# Patient Record
Sex: FEMALE | Race: WHITE | Employment: OTHER | URBAN - METROPOLITAN AREA
[De-identification: names, ages, dates, MRNs, and addresses within clinical notes are randomized per-mention and may not be internally consistent; named-entity substitution may affect disease eponyms.]

---

## 2019-02-27 ENCOUNTER — HOSPITAL ENCOUNTER (OUTPATIENT)
Facility: CLINIC | Age: 79
Discharge: HOME | End: 2019-02-27
Attending: INTERNAL MEDICINE | Admitting: INTERNAL MEDICINE
Payer: COMMERCIAL

## 2019-02-27 VITALS
SYSTOLIC BLOOD PRESSURE: 117 MMHG | HEART RATE: 69 BPM | OXYGEN SATURATION: 98 % | RESPIRATION RATE: 20 BRPM | DIASTOLIC BLOOD PRESSURE: 74 MMHG

## 2019-02-27 LAB — COLONOSCOPY: NORMAL

## 2019-02-27 PROCEDURE — 88305 TISSUE EXAM BY PATHOLOGIST: CPT | Performed by: INTERNAL MEDICINE

## 2019-02-27 PROCEDURE — 45380 COLONOSCOPY AND BIOPSY: CPT | Performed by: INTERNAL MEDICINE

## 2019-02-27 PROCEDURE — G0500 MOD SEDAT ENDO SERVICE >5YRS: HCPCS | Performed by: INTERNAL MEDICINE

## 2019-02-27 PROCEDURE — 45385 COLONOSCOPY W/LESION REMOVAL: CPT | Mod: PT | Performed by: INTERNAL MEDICINE

## 2019-02-27 PROCEDURE — 25000128 H RX IP 250 OP 636: Performed by: INTERNAL MEDICINE

## 2019-02-27 PROCEDURE — 88305 TISSUE EXAM BY PATHOLOGIST: CPT | Mod: 26 | Performed by: INTERNAL MEDICINE

## 2019-02-27 PROCEDURE — 99153 MOD SED SAME PHYS/QHP EA: CPT | Performed by: INTERNAL MEDICINE

## 2019-02-27 RX ORDER — ONDANSETRON 4 MG/1
4 TABLET, ORALLY DISINTEGRATING ORAL EVERY 6 HOURS PRN
Status: DISCONTINUED | OUTPATIENT
Start: 2019-02-27 | End: 2019-02-27 | Stop reason: HOSPADM

## 2019-02-27 RX ORDER — FLUMAZENIL 0.1 MG/ML
0.2 INJECTION, SOLUTION INTRAVENOUS
Status: DISCONTINUED | OUTPATIENT
Start: 2019-02-27 | End: 2019-02-27 | Stop reason: HOSPADM

## 2019-02-27 RX ORDER — NALOXONE HYDROCHLORIDE 0.4 MG/ML
.1-.4 INJECTION, SOLUTION INTRAMUSCULAR; INTRAVENOUS; SUBCUTANEOUS
Status: DISCONTINUED | OUTPATIENT
Start: 2019-02-27 | End: 2019-02-27 | Stop reason: HOSPADM

## 2019-02-27 RX ORDER — ONDANSETRON 2 MG/ML
4 INJECTION INTRAMUSCULAR; INTRAVENOUS EVERY 6 HOURS PRN
Status: DISCONTINUED | OUTPATIENT
Start: 2019-02-27 | End: 2019-02-27 | Stop reason: HOSPADM

## 2019-02-27 RX ORDER — FENTANYL CITRATE 50 UG/ML
INJECTION, SOLUTION INTRAMUSCULAR; INTRAVENOUS PRN
Status: DISCONTINUED | OUTPATIENT
Start: 2019-02-27 | End: 2019-02-27 | Stop reason: HOSPADM

## 2019-02-27 RX ORDER — CALCIUM CARBONATE 500(1250)
1 TABLET ORAL 2 TIMES DAILY
Status: ON HOLD | COMMUNITY
End: 2019-04-22

## 2019-02-27 RX ORDER — LIDOCAINE 40 MG/G
CREAM TOPICAL
Status: DISCONTINUED | OUTPATIENT
Start: 2019-02-27 | End: 2019-02-27 | Stop reason: HOSPADM

## 2019-02-27 RX ORDER — CHOLECALCIFEROL (VITAMIN D3) 50 MCG
1 TABLET ORAL DAILY
COMMUNITY

## 2019-02-27 RX ORDER — ONDANSETRON 2 MG/ML
4 INJECTION INTRAMUSCULAR; INTRAVENOUS
Status: DISCONTINUED | OUTPATIENT
Start: 2019-02-27 | End: 2019-02-27 | Stop reason: HOSPADM

## 2019-02-27 RX ORDER — RIBOFLAVIN (VITAMIN B2) 100 MG
100 TABLET ORAL 3 TIMES DAILY
Status: ON HOLD | COMMUNITY
End: 2019-04-22

## 2019-02-27 RX ORDER — MULTIPLE VITAMINS W/ MINERALS TAB 9MG-400MCG
1 TAB ORAL DAILY
COMMUNITY

## 2019-02-27 NOTE — PROCEDURES
PRE-PROCEDURE H&P    CHIEF COMPLAINT / REASON FOR PROCEDURE:  screening    PERTINENT HISTORY :    History reviewed. No pertinent past medical history.   Past Surgical History:   Procedure Laterality Date      SECTION       COLONOSCOPY  2019    Dr. Zayda block fibroid           Bleeding tendencies:  No    Relevant Family History:  NONE     Relevant Social History:  NONE      A relevant review of systems was performed and was negative      ALLERGIES/SENSITIVITIES:   Allergies   Allergen Reactions     Penicillin G Itching     Sulfa Drugs Itching       CURRENT MEDICATIONS:   No current outpatient medications on file.        PRE-SEDATION ASSESSMENT:    Lung Exam:  normal  Heart Exam:  normal  Airway Exam: normal  Previous reaction to anesthesia/sedation:   No  Sedation plan based on assessment: Moderate (conscious) sedation  ASA Classification:  1 - Healthy patient, no medical problems        IMPRESSION:  screening    PLAN:  colonoscopy    Carmen Loaiza  [facility]  Office: [phone]

## 2019-02-27 NOTE — DISCHARGE INSTRUCTIONS
HIGH FIBER DIET  Fiber is present in all fruits, vegetables, cereals and grains. Fiber passes through the body undigested. A high fiber diet helps food move through the intestinal tract. The added bulk is helpful in preventing constipation. In people with diverticulosis it serves to clean out the pouches along the colon wall while preventing new ones from forming. A high fiber diet also reduces the risk of colon cancer, decreases blood cholesterol and prevents high blood sugar in people with diabetes.    The foods listed below are high in fiber and should be included in your diet. If you are not used to high fiber foods, start with 1 or 2 foods from this list. Every 3-4 days add a new one to your diet until you are eating 4 high fiber foods per day. This should give you 20-35 Gm of fiber/day. It is also important to drink a lot of water when you are on this diet (6-8 glasses a day). Water causes the fiber to swell and increases the benefit.    FOODS HIGH IN DIETARY FIBER:  BREADS: Made with 100% whole wheat flour; lucille, wheat or rye crackers; tortillas, bran muffins  CEREALS: Whole grain cereal with bran (Chex, Raisin Bran, Corn Bran), oatmeal, rolled oats, granola, wheat flakes, brown rice  NUTS: Any nuts  FRUITS: All fresh fruits along with edible skins, (bananas, citrus fruit, mangoes, pears, prunes, raisins, apples, pineapple, apricot, melon, jams and marmalades), fruit juices (especially prune juice)  VEGETABLES: All types, preferably raw or lightly cooked: especially, celery, eggplant, potatoes, spinach, broccoli, brussel sprouts, winter squash, carrots, cauliflower, soybeans, lentils, fresh and dried beans of all kinds  OTHER: Popcorn, any spices      2207-5603 [employer], [address] [place] [address], [place] [address]. All rights reserved. This information is not intended as a substitute for professional medical care. Always follow your healthcare professional's instructions.      Understanding Colon and  Rectal Polyps     The colon has a smooth lining composed of millions of cells.     The colon (also called the large intestine) is a muscular tube that forms the last part of the digestive tract. It absorbs water and stores food waste. The colon is about 4 to 6 feet long. The rectum is the last 6 inches of the colon. The colon and rectum have a smooth lining composed of millions of cells. Changes in these cells can lead to growths in the colon that can become cancerous and should be removed.     When the Colon Lining Changes  Changes that occur in the cells that line the colon or rectum can lead to growths called polyps. Over a period of years, polyps can turn cancerous. Removing polyps early may prevent cancer from ever forming.      Polyps  Polyps are fleshy clumps of tissue that form on the lining of the colon or rectum. Small polyps are usually benign (not cancerous). However, over time, cells in a polyp can change and become cancerous. The larger a polyp grows, the more likely this is to happen. Also, certain types of polyps known as adenomatous polyps are considered premalignant. This means that they will almost always become cancerous if they re not removed.          Cancer  Almost all colorectal cancers start when polyp cells begin growing abnormally. As a cancerous tumor grows, it may involve more and more of the colon or rectum. In time, cancer can also grow beyond the colon or rectum and spread to nearby organs or to glands called lymph nodes. The cells can also travel to other parts of the body. This is known as metastasis. The earlier a cancerous tumor is removed, the better the chance of preventing its spread.        6227-7156 [employer] [facility] [place], [address] [place] [address], [place] [address]. All rights reserved. This information is not intended as a substitute for professional medical care. Always follow your healthcare professional's instructions.

## 2019-02-28 LAB — COPATH REPORT: NORMAL

## 2019-04-22 ENCOUNTER — HOSPITAL ENCOUNTER (OUTPATIENT)
Facility: CLINIC | Age: 79
Discharge: HOME | End: 2019-04-25
Attending: HOSPITALIST | Admitting: INTERNAL MEDICINE
Payer: COMMERCIAL

## 2019-04-22 ENCOUNTER — APPOINTMENT (OUTPATIENT)
Dept: ULTRASOUND IMAGING | Facility: CLINIC | Age: 79
End: 2019-04-22
Attending: PHYSICIAN ASSISTANT
Payer: COMMERCIAL

## 2019-04-22 DIAGNOSIS — Z90.49 S/P LAPAROSCOPIC CHOLECYSTECTOMY: Primary | ICD-10-CM

## 2019-04-22 PROBLEM — K81.0 ACUTE CHOLECYSTITIS: Status: ACTIVE | Noted: 2019-04-22

## 2019-04-22 LAB
ALBUMIN SERPL-MCNC: 3.8 G/DL (ref 3.4–5)
ALP SERPL-CCNC: 96 U/L (ref 40–150)
ALT SERPL W P-5'-P-CCNC: 17 U/L (ref 0–50)
ANION GAP SERPL CALCULATED.3IONS-SCNC: 4 MMOL/L (ref 3–14)
AST SERPL W P-5'-P-CCNC: 18 U/L (ref 0–45)
BILIRUB SERPL-MCNC: 0.7 MG/DL (ref 0.2–1.3)
BUN SERPL-MCNC: 7 MG/DL (ref 7–30)
CALCIUM SERPL-MCNC: 10.1 MG/DL (ref 8.5–10.1)
CHLORIDE SERPL-SCNC: 103 MMOL/L (ref 94–109)
CO2 SERPL-SCNC: 29 MMOL/L (ref 20–32)
CREAT SERPL-MCNC: 0.72 MG/DL (ref 0.52–1.04)
GFR SERPL CREATININE-BSD FRML MDRD: 78 ML/MIN/{1.73_M2}
GLUCOSE SERPL-MCNC: 133 MG/DL (ref 70–99)
LIPASE SERPL-CCNC: 162 U/L (ref 73–393)
POTASSIUM SERPL-SCNC: 4 MMOL/L (ref 3.4–5.3)
PROT SERPL-MCNC: 7.8 G/DL (ref 6.8–8.8)
SODIUM SERPL-SCNC: 136 MMOL/L (ref 133–144)

## 2019-04-22 PROCEDURE — 93010 ELECTROCARDIOGRAM REPORT: CPT | Performed by: INTERNAL MEDICINE

## 2019-04-22 PROCEDURE — 40000275 ZZH STATISTIC RCP TIME EA 10 MIN

## 2019-04-22 PROCEDURE — 80053 COMPREHEN METABOLIC PANEL: CPT | Performed by: PHYSICIAN ASSISTANT

## 2019-04-22 PROCEDURE — 93005 ELECTROCARDIOGRAM TRACING: CPT

## 2019-04-22 PROCEDURE — 25800030 ZZH RX IP 258 OP 636: Performed by: PHYSICIAN ASSISTANT

## 2019-04-22 PROCEDURE — 76705 ECHO EXAM OF ABDOMEN: CPT

## 2019-04-22 PROCEDURE — G0378 HOSPITAL OBSERVATION PER HR: HCPCS

## 2019-04-22 PROCEDURE — 36415 COLL VENOUS BLD VENIPUNCTURE: CPT | Performed by: PHYSICIAN ASSISTANT

## 2019-04-22 PROCEDURE — 83690 ASSAY OF LIPASE: CPT | Performed by: PHYSICIAN ASSISTANT

## 2019-04-22 PROCEDURE — 25000128 H RX IP 250 OP 636: Performed by: PHYSICIAN ASSISTANT

## 2019-04-22 PROCEDURE — 99207 ZZC CDG-CODE CATEGORY CHANGED: CPT | Performed by: PHYSICIAN ASSISTANT

## 2019-04-22 PROCEDURE — 99204 OFFICE O/P NEW MOD 45 MIN: CPT | Performed by: PHYSICIAN ASSISTANT

## 2019-04-22 RX ORDER — AMOXICILLIN 250 MG
1 CAPSULE ORAL 2 TIMES DAILY PRN
Status: DISCONTINUED | OUTPATIENT
Start: 2019-04-22 | End: 2019-04-25 | Stop reason: HOSPADM

## 2019-04-22 RX ORDER — AMOXICILLIN 250 MG
2 CAPSULE ORAL 2 TIMES DAILY PRN
Status: DISCONTINUED | OUTPATIENT
Start: 2019-04-22 | End: 2019-04-25 | Stop reason: HOSPADM

## 2019-04-22 RX ORDER — ACETAMINOPHEN 650 MG/1
650 SUPPOSITORY RECTAL EVERY 4 HOURS PRN
Status: DISCONTINUED | OUTPATIENT
Start: 2019-04-22 | End: 2019-04-25 | Stop reason: HOSPADM

## 2019-04-22 RX ORDER — BRIMONIDINE TARTRATE 1.5 MG/ML
1 SOLUTION/ DROPS OPHTHALMIC AT BEDTIME
COMMUNITY

## 2019-04-22 RX ORDER — ACETAMINOPHEN 500 MG
500-1000 TABLET ORAL 2 TIMES DAILY PRN
COMMUNITY

## 2019-04-22 RX ORDER — ONDANSETRON 4 MG/1
4 TABLET, ORALLY DISINTEGRATING ORAL EVERY 6 HOURS PRN
Status: DISCONTINUED | OUTPATIENT
Start: 2019-04-22 | End: 2019-04-23

## 2019-04-22 RX ORDER — NALOXONE HYDROCHLORIDE 0.4 MG/ML
.1-.4 INJECTION, SOLUTION INTRAMUSCULAR; INTRAVENOUS; SUBCUTANEOUS
Status: DISCONTINUED | OUTPATIENT
Start: 2019-04-22 | End: 2019-04-23

## 2019-04-22 RX ORDER — ONDANSETRON 2 MG/ML
4 INJECTION INTRAMUSCULAR; INTRAVENOUS EVERY 6 HOURS PRN
Status: DISCONTINUED | OUTPATIENT
Start: 2019-04-22 | End: 2019-04-23

## 2019-04-22 RX ORDER — POLYETHYLENE GLYCOL 3350 17 G/17G
17 POWDER, FOR SOLUTION ORAL DAILY PRN
Status: DISCONTINUED | OUTPATIENT
Start: 2019-04-22 | End: 2019-04-25 | Stop reason: HOSPADM

## 2019-04-22 RX ORDER — SODIUM CHLORIDE, SODIUM LACTATE, POTASSIUM CHLORIDE, CALCIUM CHLORIDE 600; 310; 30; 20 MG/100ML; MG/100ML; MG/100ML; MG/100ML
INJECTION, SOLUTION INTRAVENOUS CONTINUOUS
Status: DISCONTINUED | OUTPATIENT
Start: 2019-04-22 | End: 2019-04-24

## 2019-04-22 RX ORDER — ACETAMINOPHEN 325 MG/1
650 TABLET ORAL EVERY 4 HOURS PRN
Status: DISCONTINUED | OUTPATIENT
Start: 2019-04-22 | End: 2019-04-23

## 2019-04-22 RX ORDER — HYDROMORPHONE HYDROCHLORIDE 1 MG/ML
0.3 INJECTION, SOLUTION INTRAMUSCULAR; INTRAVENOUS; SUBCUTANEOUS
Status: DISCONTINUED | OUTPATIENT
Start: 2019-04-22 | End: 2019-04-23

## 2019-04-22 RX ORDER — OXYCODONE HYDROCHLORIDE 5 MG/1
5-10 TABLET ORAL
Status: DISCONTINUED | OUTPATIENT
Start: 2019-04-22 | End: 2019-04-25 | Stop reason: HOSPADM

## 2019-04-22 RX ADMIN — SODIUM CHLORIDE, POTASSIUM CHLORIDE, SODIUM LACTATE AND CALCIUM CHLORIDE: 600; 310; 30; 20 INJECTION, SOLUTION INTRAVENOUS at 18:38

## 2019-04-22 RX ADMIN — Medication 0.3 MG: at 20:37

## 2019-04-23 ENCOUNTER — APPOINTMENT (OUTPATIENT)
Dept: SURGERY | Facility: PHYSICIAN GROUP | Age: 79
End: 2019-04-23
Payer: COMMERCIAL

## 2019-04-23 ENCOUNTER — APPOINTMENT (OUTPATIENT)
Dept: GENERAL RADIOLOGY | Facility: CLINIC | Age: 79
End: 2019-04-23
Attending: SURGERY
Payer: COMMERCIAL

## 2019-04-23 ENCOUNTER — ANESTHESIA EVENT (OUTPATIENT)
Dept: SURGERY | Facility: CLINIC | Age: 79
End: 2019-04-23
Payer: COMMERCIAL

## 2019-04-23 ENCOUNTER — ANESTHESIA (OUTPATIENT)
Dept: SURGERY | Facility: CLINIC | Age: 79
End: 2019-04-23
Payer: COMMERCIAL

## 2019-04-23 PROBLEM — K81.9 CHOLECYSTITIS: Status: ACTIVE | Noted: 2019-04-23

## 2019-04-23 LAB
ALBUMIN SERPL-MCNC: 3.4 G/DL (ref 3.4–5)
ALP SERPL-CCNC: 85 U/L (ref 40–150)
ALT SERPL W P-5'-P-CCNC: 16 U/L (ref 0–50)
ANION GAP SERPL CALCULATED.3IONS-SCNC: 5 MMOL/L (ref 3–14)
AST SERPL W P-5'-P-CCNC: 15 U/L (ref 0–45)
BILIRUB SERPL-MCNC: 0.7 MG/DL (ref 0.2–1.3)
BUN SERPL-MCNC: 7 MG/DL (ref 7–30)
CALCIUM SERPL-MCNC: 9.7 MG/DL (ref 8.5–10.1)
CHLORIDE SERPL-SCNC: 105 MMOL/L (ref 94–109)
CO2 SERPL-SCNC: 28 MMOL/L (ref 20–32)
CREAT SERPL-MCNC: 0.72 MG/DL (ref 0.52–1.04)
ERYTHROCYTE [DISTWIDTH] IN BLOOD BY AUTOMATED COUNT: 14.2 % (ref 10–15)
GFR SERPL CREATININE-BSD FRML MDRD: 79 ML/MIN/{1.73_M2}
GLUCOSE SERPL-MCNC: 129 MG/DL (ref 70–99)
GRAM STN SPEC: NORMAL
GRAM STN SPEC: NORMAL
HCT VFR BLD AUTO: 40.5 % (ref 35–47)
HGB BLD-MCNC: 12.9 G/DL (ref 11.7–15.7)
MCH RBC QN AUTO: 27.7 PG (ref 26.5–33)
MCHC RBC AUTO-ENTMCNC: 31.9 G/DL (ref 31.5–36.5)
MCV RBC AUTO: 87 FL (ref 78–100)
PLATELET # BLD AUTO: 248 10E9/L (ref 150–450)
POTASSIUM SERPL-SCNC: 3.9 MMOL/L (ref 3.4–5.3)
PROT SERPL-MCNC: 7.3 G/DL (ref 6.8–8.8)
RBC # BLD AUTO: 4.65 10E12/L (ref 3.8–5.2)
SODIUM SERPL-SCNC: 138 MMOL/L (ref 133–144)
SPECIMEN SOURCE: NORMAL
WBC # BLD AUTO: 14.2 10E9/L (ref 4–11)

## 2019-04-23 PROCEDURE — 36415 COLL VENOUS BLD VENIPUNCTURE: CPT | Performed by: PHYSICIAN ASSISTANT

## 2019-04-23 PROCEDURE — 99212 OFFICE O/P EST SF 10 MIN: CPT | Performed by: INTERNAL MEDICINE

## 2019-04-23 PROCEDURE — 25000128 H RX IP 250 OP 636: Performed by: PHYSICIAN ASSISTANT

## 2019-04-23 PROCEDURE — 85027 COMPLETE CBC AUTOMATED: CPT | Performed by: PHYSICIAN ASSISTANT

## 2019-04-23 PROCEDURE — 25800030 ZZH RX IP 258 OP 636: Performed by: NURSE ANESTHETIST, CERTIFIED REGISTERED

## 2019-04-23 PROCEDURE — 37000009 ZZH ANESTHESIA TECHNICAL FEE, EACH ADDTL 15 MIN: Performed by: SURGERY

## 2019-04-23 PROCEDURE — 80053 COMPREHEN METABOLIC PANEL: CPT | Performed by: PHYSICIAN ASSISTANT

## 2019-04-23 PROCEDURE — 36000060 ZZH SURGERY LEVEL 3 W FLUORO 1ST 30 MIN: Performed by: SURGERY

## 2019-04-23 PROCEDURE — 88304 TISSUE EXAM BY PATHOLOGIST: CPT | Performed by: SURGERY

## 2019-04-23 PROCEDURE — 25000128 H RX IP 250 OP 636: Performed by: ANESTHESIOLOGY

## 2019-04-23 PROCEDURE — 47563 LAPARO CHOLECYSTECTOMY/GRAPH: CPT | Mod: 22 | Performed by: SURGERY

## 2019-04-23 PROCEDURE — 71000012 ZZH RECOVERY PHASE 1 LEVEL 1 FIRST HR: Performed by: SURGERY

## 2019-04-23 PROCEDURE — 99207 ZZC CDG-CODE CATEGORY CHANGED: CPT | Performed by: INTERNAL MEDICINE

## 2019-04-23 PROCEDURE — 37000008 ZZH ANESTHESIA TECHNICAL FEE, 1ST 30 MIN: Performed by: SURGERY

## 2019-04-23 PROCEDURE — 40000306 ZZH STATISTIC PRE PROC ASSESS II: Performed by: SURGERY

## 2019-04-23 PROCEDURE — 25800030 ZZH RX IP 258 OP 636: Performed by: PHYSICIAN ASSISTANT

## 2019-04-23 PROCEDURE — 25000128 H RX IP 250 OP 636: Performed by: SURGERY

## 2019-04-23 PROCEDURE — 25000125 ZZHC RX 250: Performed by: NURSE ANESTHETIST, CERTIFIED REGISTERED

## 2019-04-23 PROCEDURE — 27210794 ZZH OR GENERAL SUPPLY STERILE: Performed by: SURGERY

## 2019-04-23 PROCEDURE — 99203 OFFICE O/P NEW LOW 30 MIN: CPT | Mod: 57 | Performed by: SURGERY

## 2019-04-23 PROCEDURE — 71000013 ZZH RECOVERY PHASE 1 LEVEL 1 EA ADDTL HR: Performed by: SURGERY

## 2019-04-23 PROCEDURE — 87070 CULTURE OTHR SPECIMN AEROBIC: CPT | Performed by: SURGERY

## 2019-04-23 PROCEDURE — 87205 SMEAR GRAM STAIN: CPT | Performed by: SURGERY

## 2019-04-23 PROCEDURE — 25000128 H RX IP 250 OP 636: Performed by: NURSE ANESTHETIST, CERTIFIED REGISTERED

## 2019-04-23 PROCEDURE — 36000058 ZZH SURGERY LEVEL 3 EA 15 ADDTL MIN: Performed by: SURGERY

## 2019-04-23 PROCEDURE — 25800025 ZZH RX 258: Performed by: SURGERY

## 2019-04-23 PROCEDURE — 47563 LAPARO CHOLECYSTECTOMY/GRAPH: CPT | Mod: AS | Performed by: PHYSICIAN ASSISTANT

## 2019-04-23 PROCEDURE — 25500064 ZZH RX 255 OP 636: Performed by: SURGERY

## 2019-04-23 PROCEDURE — 87075 CULTR BACTERIA EXCEPT BLOOD: CPT | Performed by: SURGERY

## 2019-04-23 PROCEDURE — G0378 HOSPITAL OBSERVATION PER HR: HCPCS

## 2019-04-23 PROCEDURE — 25000125 ZZHC RX 250: Performed by: SURGERY

## 2019-04-23 PROCEDURE — 25800030 ZZH RX IP 258 OP 636: Performed by: SURGERY

## 2019-04-23 PROCEDURE — 40000277 XR SURGERY CARM FLUORO LESS THAN 5 MIN W STILLS

## 2019-04-23 PROCEDURE — 25000125 ZZHC RX 250: Performed by: INTERNAL MEDICINE

## 2019-04-23 RX ORDER — ONDANSETRON 2 MG/ML
4 INJECTION INTRAMUSCULAR; INTRAVENOUS EVERY 6 HOURS PRN
Status: DISCONTINUED | OUTPATIENT
Start: 2019-04-23 | End: 2019-04-25 | Stop reason: HOSPADM

## 2019-04-23 RX ORDER — ONDANSETRON 4 MG/1
4 TABLET, ORALLY DISINTEGRATING ORAL EVERY 30 MIN PRN
Status: DISCONTINUED | OUTPATIENT
Start: 2019-04-23 | End: 2019-04-23 | Stop reason: HOSPADM

## 2019-04-23 RX ORDER — ONDANSETRON 2 MG/ML
4 INJECTION INTRAMUSCULAR; INTRAVENOUS EVERY 30 MIN PRN
Status: DISCONTINUED | OUTPATIENT
Start: 2019-04-23 | End: 2019-04-23 | Stop reason: HOSPADM

## 2019-04-23 RX ORDER — GLYCOPYRROLATE 0.2 MG/ML
INJECTION, SOLUTION INTRAMUSCULAR; INTRAVENOUS PRN
Status: DISCONTINUED | OUTPATIENT
Start: 2019-04-23 | End: 2019-04-23

## 2019-04-23 RX ORDER — FENTANYL CITRATE 50 UG/ML
25-50 INJECTION, SOLUTION INTRAMUSCULAR; INTRAVENOUS
Status: DISCONTINUED | OUTPATIENT
Start: 2019-04-23 | End: 2019-04-23 | Stop reason: HOSPADM

## 2019-04-23 RX ORDER — ALBUTEROL SULFATE 0.83 MG/ML
2.5 SOLUTION RESPIRATORY (INHALATION) EVERY 4 HOURS PRN
Status: DISCONTINUED | OUTPATIENT
Start: 2019-04-23 | End: 2019-04-23 | Stop reason: HOSPADM

## 2019-04-23 RX ORDER — MEPERIDINE HYDROCHLORIDE 50 MG/ML
12.5 INJECTION INTRAMUSCULAR; INTRAVENOUS; SUBCUTANEOUS
Status: DISCONTINUED | OUTPATIENT
Start: 2019-04-23 | End: 2019-04-23 | Stop reason: HOSPADM

## 2019-04-23 RX ORDER — SODIUM CHLORIDE 9 MG/ML
INJECTION, SOLUTION INTRAVENOUS CONTINUOUS
Status: DISCONTINUED | OUTPATIENT
Start: 2019-04-23 | End: 2019-04-24

## 2019-04-23 RX ORDER — NEOSTIGMINE METHYLSULFATE 1 MG/ML
VIAL (ML) INJECTION PRN
Status: DISCONTINUED | OUTPATIENT
Start: 2019-04-23 | End: 2019-04-23

## 2019-04-23 RX ORDER — METOPROLOL TARTRATE 1 MG/ML
1-2 INJECTION, SOLUTION INTRAVENOUS EVERY 5 MIN PRN
Status: DISCONTINUED | OUTPATIENT
Start: 2019-04-23 | End: 2019-04-23 | Stop reason: HOSPADM

## 2019-04-23 RX ORDER — ONDANSETRON 2 MG/ML
INJECTION INTRAMUSCULAR; INTRAVENOUS PRN
Status: DISCONTINUED | OUTPATIENT
Start: 2019-04-23 | End: 2019-04-23

## 2019-04-23 RX ORDER — NALOXONE HYDROCHLORIDE 0.4 MG/ML
.1-.4 INJECTION, SOLUTION INTRAMUSCULAR; INTRAVENOUS; SUBCUTANEOUS
Status: DISCONTINUED | OUTPATIENT
Start: 2019-04-23 | End: 2019-04-23 | Stop reason: HOSPADM

## 2019-04-23 RX ORDER — HYDROMORPHONE HYDROCHLORIDE 1 MG/ML
.3-.5 INJECTION, SOLUTION INTRAMUSCULAR; INTRAVENOUS; SUBCUTANEOUS EVERY 10 MIN PRN
Status: DISCONTINUED | OUTPATIENT
Start: 2019-04-23 | End: 2019-04-23 | Stop reason: HOSPADM

## 2019-04-23 RX ORDER — PROPOFOL 10 MG/ML
INJECTION, EMULSION INTRAVENOUS PRN
Status: DISCONTINUED | OUTPATIENT
Start: 2019-04-23 | End: 2019-04-23

## 2019-04-23 RX ORDER — NALOXONE HYDROCHLORIDE 0.4 MG/ML
.1-.4 INJECTION, SOLUTION INTRAMUSCULAR; INTRAVENOUS; SUBCUTANEOUS
Status: DISCONTINUED | OUTPATIENT
Start: 2019-04-23 | End: 2019-04-25 | Stop reason: HOSPADM

## 2019-04-23 RX ORDER — ACETAMINOPHEN 325 MG/1
650 TABLET ORAL EVERY 6 HOURS PRN
Status: DISCONTINUED | OUTPATIENT
Start: 2019-04-23 | End: 2019-04-25 | Stop reason: HOSPADM

## 2019-04-23 RX ORDER — DIMENHYDRINATE 50 MG/ML
25 INJECTION, SOLUTION INTRAMUSCULAR; INTRAVENOUS
Status: DISCONTINUED | OUTPATIENT
Start: 2019-04-23 | End: 2019-04-23 | Stop reason: HOSPADM

## 2019-04-23 RX ORDER — LIDOCAINE 40 MG/G
CREAM TOPICAL
Status: CANCELLED | OUTPATIENT
Start: 2019-04-23

## 2019-04-23 RX ORDER — FENTANYL CITRATE 50 UG/ML
INJECTION, SOLUTION INTRAMUSCULAR; INTRAVENOUS PRN
Status: DISCONTINUED | OUTPATIENT
Start: 2019-04-23 | End: 2019-04-23

## 2019-04-23 RX ORDER — LIDOCAINE 40 MG/G
CREAM TOPICAL
Status: DISCONTINUED | OUTPATIENT
Start: 2019-04-23 | End: 2019-04-25 | Stop reason: HOSPADM

## 2019-04-23 RX ORDER — DEXAMETHASONE SODIUM PHOSPHATE 4 MG/ML
INJECTION, SOLUTION INTRA-ARTICULAR; INTRALESIONAL; INTRAMUSCULAR; INTRAVENOUS; SOFT TISSUE PRN
Status: DISCONTINUED | OUTPATIENT
Start: 2019-04-23 | End: 2019-04-23

## 2019-04-23 RX ORDER — LIDOCAINE HYDROCHLORIDE 10 MG/ML
INJECTION, SOLUTION INFILTRATION; PERINEURAL PRN
Status: DISCONTINUED | OUTPATIENT
Start: 2019-04-23 | End: 2019-04-23

## 2019-04-23 RX ORDER — SODIUM CHLORIDE, SODIUM LACTATE, POTASSIUM CHLORIDE, CALCIUM CHLORIDE 600; 310; 30; 20 MG/100ML; MG/100ML; MG/100ML; MG/100ML
INJECTION, SOLUTION INTRAVENOUS CONTINUOUS
Status: DISCONTINUED | OUTPATIENT
Start: 2019-04-23 | End: 2019-04-23 | Stop reason: HOSPADM

## 2019-04-23 RX ORDER — CLINDAMYCIN PHOSPHATE 600 MG/50ML
600 INJECTION, SOLUTION INTRAVENOUS
Status: COMPLETED | OUTPATIENT
Start: 2019-04-23 | End: 2019-04-23

## 2019-04-23 RX ORDER — BUPIVACAINE HYDROCHLORIDE AND EPINEPHRINE 2.5; 5 MG/ML; UG/ML
INJECTION, SOLUTION EPIDURAL; INFILTRATION; INTRACAUDAL; PERINEURAL PRN
Status: DISCONTINUED | OUTPATIENT
Start: 2019-04-23 | End: 2019-04-23 | Stop reason: HOSPADM

## 2019-04-23 RX ORDER — SODIUM CHLORIDE, SODIUM LACTATE, POTASSIUM CHLORIDE, CALCIUM CHLORIDE 600; 310; 30; 20 MG/100ML; MG/100ML; MG/100ML; MG/100ML
INJECTION, SOLUTION INTRAVENOUS CONTINUOUS
Status: CANCELLED | OUTPATIENT
Start: 2019-04-23

## 2019-04-23 RX ORDER — HYDROMORPHONE HYDROCHLORIDE 1 MG/ML
0.2 INJECTION, SOLUTION INTRAMUSCULAR; INTRAVENOUS; SUBCUTANEOUS
Status: DISCONTINUED | OUTPATIENT
Start: 2019-04-23 | End: 2019-04-25 | Stop reason: HOSPADM

## 2019-04-23 RX ORDER — CIPROFLOXACIN 2 MG/ML
400 INJECTION, SOLUTION INTRAVENOUS EVERY 12 HOURS
Status: DISCONTINUED | OUTPATIENT
Start: 2019-04-23 | End: 2019-04-24

## 2019-04-23 RX ORDER — ONDANSETRON 4 MG/1
4 TABLET, ORALLY DISINTEGRATING ORAL EVERY 6 HOURS PRN
Status: DISCONTINUED | OUTPATIENT
Start: 2019-04-23 | End: 2019-04-25 | Stop reason: HOSPADM

## 2019-04-23 RX ADMIN — FENTANYL CITRATE 50 MCG: 50 INJECTION, SOLUTION INTRAMUSCULAR; INTRAVENOUS at 14:02

## 2019-04-23 RX ADMIN — HYDROMORPHONE HYDROCHLORIDE 0.5 MG: 1 INJECTION, SOLUTION INTRAMUSCULAR; INTRAVENOUS; SUBCUTANEOUS at 14:37

## 2019-04-23 RX ADMIN — FENTANYL CITRATE 50 MCG: 50 INJECTION, SOLUTION INTRAMUSCULAR; INTRAVENOUS at 12:21

## 2019-04-23 RX ADMIN — SODIUM CHLORIDE, POTASSIUM CHLORIDE, SODIUM LACTATE AND CALCIUM CHLORIDE: 600; 310; 30; 20 INJECTION, SOLUTION INTRAVENOUS at 04:22

## 2019-04-23 RX ADMIN — FENTANYL CITRATE 50 MCG: 50 INJECTION, SOLUTION INTRAMUSCULAR; INTRAVENOUS at 14:13

## 2019-04-23 RX ADMIN — GLYCOPYRROLATE 0.4 MG: 0.2 INJECTION, SOLUTION INTRAMUSCULAR; INTRAVENOUS at 13:27

## 2019-04-23 RX ADMIN — CIPROFLOXACIN 400 MG: 2 INJECTION, SOLUTION INTRAVENOUS at 20:19

## 2019-04-23 RX ADMIN — LIDOCAINE HYDROCHLORIDE 30 MG: 10 INJECTION, SOLUTION INFILTRATION; PERINEURAL at 12:21

## 2019-04-23 RX ADMIN — METRONIDAZOLE 500 MG: 500 INJECTION, SOLUTION INTRAVENOUS at 18:56

## 2019-04-23 RX ADMIN — ROCURONIUM BROMIDE 35 MG: 10 INJECTION INTRAVENOUS at 12:21

## 2019-04-23 RX ADMIN — PROPOFOL 120 MG: 10 INJECTION, EMULSION INTRAVENOUS at 12:21

## 2019-04-23 RX ADMIN — GLYCOPYRROLATE 0.2 MG: 0.2 INJECTION, SOLUTION INTRAMUSCULAR; INTRAVENOUS at 12:21

## 2019-04-23 RX ADMIN — ONDANSETRON 4 MG: 2 INJECTION INTRAMUSCULAR; INTRAVENOUS at 12:46

## 2019-04-23 RX ADMIN — Medication 2.5 MG: at 13:27

## 2019-04-23 RX ADMIN — HYDROMORPHONE HYDROCHLORIDE 0.5 MG: 1 INJECTION, SOLUTION INTRAMUSCULAR; INTRAVENOUS; SUBCUTANEOUS at 14:25

## 2019-04-23 RX ADMIN — Medication 0.3 MG: at 00:59

## 2019-04-23 RX ADMIN — DEXAMETHASONE SODIUM PHOSPHATE 4 MG: 4 INJECTION, SOLUTION INTRA-ARTICULAR; INTRALESIONAL; INTRAMUSCULAR; INTRAVENOUS; SOFT TISSUE at 12:21

## 2019-04-23 RX ADMIN — SODIUM CHLORIDE: 9 INJECTION, SOLUTION INTRAVENOUS at 18:13

## 2019-04-23 RX ADMIN — FENTANYL CITRATE 100 MCG: 50 INJECTION, SOLUTION INTRAMUSCULAR; INTRAVENOUS at 12:17

## 2019-04-23 RX ADMIN — FENTANYL CITRATE 50 MCG: 50 INJECTION, SOLUTION INTRAMUSCULAR; INTRAVENOUS at 12:52

## 2019-04-23 RX ADMIN — CLINDAMYCIN IN 5 PERCENT DEXTROSE 600 MG: 12 INJECTION, SOLUTION INTRAVENOUS at 12:13

## 2019-04-23 RX ADMIN — PHENYLEPHRINE HYDROCHLORIDE 100 MCG: 10 INJECTION, SOLUTION INTRAMUSCULAR; INTRAVENOUS; SUBCUTANEOUS at 12:29

## 2019-04-23 RX ADMIN — PHENYLEPHRINE HYDROCHLORIDE 100 MCG: 10 INJECTION, SOLUTION INTRAMUSCULAR; INTRAVENOUS; SUBCUTANEOUS at 12:33

## 2019-04-23 RX ADMIN — SODIUM CHLORIDE, POTASSIUM CHLORIDE, SODIUM LACTATE AND CALCIUM CHLORIDE: 600; 310; 30; 20 INJECTION, SOLUTION INTRAVENOUS at 12:13

## 2019-04-23 RX ADMIN — HYDROMORPHONE HYDROCHLORIDE 0.5 MG: 1 INJECTION, SOLUTION INTRAMUSCULAR; INTRAVENOUS; SUBCUTANEOUS at 15:00

## 2019-04-23 RX ADMIN — FENTANYL CITRATE 50 MCG: 50 INJECTION, SOLUTION INTRAMUSCULAR; INTRAVENOUS at 16:02

## 2019-04-23 ASSESSMENT — MIFFLIN-ST. JEOR: SCORE: 1394.11

## 2019-04-23 NOTE — CONSULTS
General Surgery Consultation    Carmen Dumont MRN# [id number]   Age: 79 year old YOB: 1940     Date of Admission:  2019    Reason for consult:            Abdominal pain, epigastric       Requesting physician:            Tonny                Assessment and Plan:   Assessment:   cholecystitis      Plan:   Discussed cholecystectomy in detail with patient and son - incisions, conversion, post wesley SX and RX, duct/organ injury, expected recovery. Also discussed low fat diet, dissolution, lithotripsy.    She would like to proceed, prefers female surgeon if possible.   Teaching booklet ordered                 Chief Complaint:   Abdominal pain, right upper quadrant     History is obtained from the patient and electronic health record         History of Present Illness:   This patient is a 79 year old  female with a significant past medical history of arthritis and asthma who presents with RUQ pain , nausea and vomiting..          Past Medical History:    has no past medical history on file.          Past Surgical History:     Past Surgical History:   Procedure Laterality Date      SECTION       COLONOSCOPY  2019    Dr. Priest [facility]     COLONOSCOPY N/A 2019    Procedure: COLONOSCOPY with biopsies by cold forcep and polypectomy by cold snare;  Surgeon: Carmen Loaiza MD;  Location:  GI     utreine fibroid               Social History:     Social History     Tobacco Use     Smoking status: Former Smoker     Smokeless tobacco: Never Used   Substance Use Topics     Alcohol use: Yes     Comment: wine as needed             Family History:   This patient has no significant family history, no gallstones          Allergies:     Allergies   Allergen Reactions     Penicillin G Itching     Sulfa Drugs Itching             Medications:     No current facility-administered medications on file prior to encounter.   Current Outpatient Medications on File Prior to Encounter:  acetaminophen  (TYLENOL) 500 MG tablet Take 500-1,000 mg by mouth 2 times daily as needed for mild pain   Ascorbic Acid (VITAMIN C PO) Take 1 tablet by mouth daily   brimonidine (ALPHAGAN-P) 0.15 % ophthalmic solution Place 1 drop into both eyes At Bedtime   calcium-magnesium (CALMAG) 500-250 MG TABS per tablet Take 1 tablet by mouth daily as needed   Hypromellose (ARTIFICIAL TEARS OP) Place 1 drop into both eyes At Bedtime To help with burning when using Brimonidine   multivitamin w/minerals (MULTI-VITAMIN) tablet Take 1 tablet by mouth daily   Omega-3 Fatty Acids (FISH OIL PO) Take 2 capsules by mouth 2 times daily   POTASSIUM PO Take 1 tablet by mouth daily Over the counter product   vitamin D3 (CHOLECALCIFEROL) 2000 units tablet Take 1 tablet by mouth daily              Review of Systems:   The Review of Systems is negative other than noted in the HPI          Physical Exam:   Gen:  This is a well developed, well nourished female in no apparent distress.  Blood pressure 163/75, pulse 85, temperature 99.6  F (37.6  C), temperature source Oral, resp. rate 14, SpO2 100 %.  HEENT - Normocephalic, atraumatic, mucous membranes nl.  no scleral icterus.  Neck - supple without masses  Lungs - clear to ascultation.    Heart - Regular rate & rhythm without murmur  Abdomen:   soft, non-distended, tenderness noted in the right upper quadrant and no masses palpated   Extremities - warm without edema  Neurologic - nonfocal          Data:   WBC - No results found for: WBC], HgB - No results found for: HGB]   Liver Function Studies -   Recent Labs   Lab Test 04/22/19  1826   PROTTOTAL 7.8   ALBUMIN 3.8   BILITOTAL 0.7   ALKPHOS 96   AST 18   ALT 17       Gallbladder ultrasound:   Common bile duct not seen  Findings consistent with acute cholecystitis  Gallstone seen > 2 cm  Gallbladder wall thickening to 0.6 cm        Juan Hines MD

## 2019-04-23 NOTE — PROGRESS NOTES
Pt arrived to room 608 around 1700 accompanied by her son. Pt walked to the floor and walked to her room. Abdominal pain rated at at 2/10. Had an ultrasound and an EKG, (normal sinus rhythm and normal ECG) in preparation of a possible surgery. Pt and family upset that she was not getting surgery immediately as they taught that she was going to have an emergency surgery from [facility] they understood from the urgent care doctor. Besides, pt states that doc was ok with her stopping at home before coming to the  Hospital. Pt complaining of nausea, no emesis noted. NPO except ice chips and medications. Pt did not tolerated ice chips as it made her even more nauseated. Pain rated at 8/10 around 2000, opted for Dilaudid IV, has been sleeping since. Son at the bed side, IV infusing. Will keep monitoring.

## 2019-04-23 NOTE — PHARMACY-ADMISSION MEDICATION HISTORY
Admission medication history interview status for this patient is complete. See [facility] admission navigator for allergy information, prior to admission medications and immunization status.     Medication history interview source(s):Patient  Medication history resources (including written lists, pill bottles, clinic record):EPIC    Changes made to PTA medication list:  Added: many supplements, eye drops,    Additional medication history information: pt is using many supplements    Medication reconciliation/reorder completed by provider prior to medication history? No    Do you take OTC medications (eg tylenol, ibuprofen, fish oil, eye/ear drops, etc)? Y(Y/N)    For patients on insulin therapy: N (Y/N)  Lantus/levemir/NPH/Mix 70/30 dose:   (Y/N) (see Med list for doses)   Sliding scale Novolog Y/N  If Yes, do you have a baseline novolog pre-meal dose:  units with meals  Patients eat three meals a day:   Y/N    How many episodes of hypoglycemia do you have per week: _______  How many missed doses do you have per week: ______  How many times do you check your blood glucose per day: _______  Do you have a Continuous glucose monitor (CGM)   Y/N (remind pt that not approved for hospital use)   Any Barriers to therapy - Be specific :  cost of medications, comfortable with giving injections (if applicable), comfortable and confident with current diabetes regimen: Y/N ______________      Prior to Admission medications    Medication Sig Last Dose Taking? Auth Provider   acetaminophen (TYLENOL) 500 MG tablet Take 500-1,000 mg by mouth 2 times daily as needed for mild pain  Yes Unknown, Entered By History   Ascorbic Acid (VITAMIN C PO) Take 1 tablet by mouth daily  Yes Unknown, Entered By History   brimonidine (ALPHAGAN-P) 0.15 % ophthalmic solution Place 1 drop into both eyes At Bedtime 4/21/2019 at Unknown time Yes Unknown, Entered By History   calcium-magnesium (CALMAG) 500-250 MG TABS per tablet Take 1 tablet by mouth daily as  needed  Yes Unknown, Entered By History   Hypromellose (ARTIFICIAL TEARS OP) Place 1 drop into both eyes At Bedtime To help with burning when using Brimonidine 4/21/2019 at Unknown time Yes Unknown, Entered By History   multivitamin w/minerals (MULTI-VITAMIN) tablet Take 1 tablet by mouth daily  Yes Reported, Patient   Omega-3 Fatty Acids (FISH OIL PO) Take 2 capsules by mouth 2 times daily  Yes Unknown, Entered By History   POTASSIUM PO Take 1 tablet by mouth daily Over the counter product  Yes Unknown, Entered By History   vitamin D3 (CHOLECALCIFEROL) 2000 units tablet Take 1 tablet by mouth daily  Yes Reported, Patient

## 2019-04-23 NOTE — DISCHARGE INSTRUCTIONS
GENERAL ANESTHESIA OR SEDATION ADULT DISCHARGE INSTRUCTIONS   SPECIAL PRECAUTIONS FOR 24 HOURS AFTER SURGERY    IT IS NOT UNUSUAL TO FEEL LIGHT-HEADED OR FAINT, UP TO 24 HOURS AFTER SURGERY OR WHILE TAKING PAIN MEDICATION.  IF YOU HAVE THESE SYMPTOMS; SIT FOR A FEW MINUTES BEFORE STANDING AND HAVE SOMEONE ASSIST YOU WHEN YOU GET UP TO WALK OR USE THE BATHROOM.    YOU SHOULD REST AND RELAX FOR THE NEXT 24 HOURS AND YOU MUST MAKE ARRANGEMENTS TO HAVE SOMEONE STAY WITH YOU FOR AT LEAST 24 HOURS AFTER YOUR DISCHARGE.  AVOID HAZARDOUS AND STRENUOUS ACTIVITIES.  DO NOT MAKE IMPORTANT DECISIONS FOR 24 HOURS.    DO NOT DRIVE ANY VEHICLE OR OPERATE MECHANICAL EQUIPMENT FOR 24 HOURS FOLLOWING THE END OF YOUR SURGERY.  EVEN THOUGH YOU MAY FEEL NORMAL, YOUR REACTIONS MAY BE AFFECTED BY THE MEDICATION YOU HAVE RECEIVED.    DO NOT DRINK ALCOHOLIC BEVERAGES FOR 24 HOURS FOLLOWING YOUR SURGERY.    DRINK CLEAR LIQUIDS (APPLE JUICE, GINGER ALE, 7-UP, BROTH, ETC.).  PROGRESS TO YOUR REGULAR DIET AS YOU FEEL ABLE.    YOU MAY HAVE A DRY MOUTH, A SORE THROAT, MUSCLES ACHES OR TROUBLE SLEEPING.  THESE SHOULD GO AWAY AFTER 24 HOURS.    CALL YOUR DOCTOR FOR ANY OF THE FOLLOWING:  SIGNS OF INFECTION (FEVER, GROWING TENDERNESS AT THE SURGERY SITE, A LARGE AMOUNT OF DRAINAGE OR BLEEDING, SEVERE PAIN, FOUL-SMELLING DRAINAGE, REDNESS OR SWELLING.    IT HAS BEEN OVER 8 TO 10 HOURS SINCE SURGERY AND YOU ARE STILL NOT ABLE TO URINATE (PASS WATER).     HOME CARE FOLLOWING LAPAROSCOPIC CHOLECYSTECTOMY  OLIVIA Balderas, LEIGH Iraheta. CATHERINE Hager &  MARI Marinelli      IINCISIONAL CARE:  Replace the bandage over your incisions until all drainage stops, or if more comfortable to have in place.  If present, leave the steri-strips (white paper tapes) in place for 14 days after surgery.   BATHING:  Avoid baths for 1 week after surgery.  Showers are okay.  You may wash your hair at any time.  Gently pat your incisions dry after  bathing.  ACTIVITY:  Light Activity -- you may immediately be up and about as tolerated.  Driving -- you may drive when comfortable and off narcotic pain medications.  Light Work -- resume when comfortable off pain medications.  (If you can drive, you probably can work.)  Strenuous Work/Activity -- limit lifting to 20 pounds for 2 weeks.  Progressively increase with time.  Active Sports (running, biking, etc.) -- cautiously resume after 2 weeks.  DISCOMFORT:  Use pain medications as prescribed by your surgeon.  Take the pain medication with some food, when possible, to minimize side effects.  Intermittent use of ice packs at the incision sites may help during the first 48 hours.  Expect gradual improvement.  You may experience shoulder pain, which is due to the air placed within your abdomen during the procedure.  This is temporary and usually passes within 2 days.  DIET:  Drink plenty of fluids.  While taking pain medications, increase dietary fiber or add a fiber supplementation like Metamucil or Citrucel to help prevent constipation - a possible side effect of pain medications.  If taking Metamucil or Citrucel, take with plenty of fluids as instructed.  It is not uncommon to experience some bowel changes (loose stools or constipation) after surgery.  Your body has to adapt to you no longer having a gall bladder.  To help minimize this side effect, avoid fatty foods for the first week after surgery.  You may then slowly increase the amount of fatty foods in your diet.    NAUSEA:  If nauseated from the anesthetic/pain meds; rest in bed, get up cautiously with assistance, and drink clear liquids (juice, tea, broth).  RETURN APPOINTMENT:  Schedule a follow-up visit 2-3 weeks post-op.  Office Phone:  [phone]    CONTACT US IF THE FOLLOWING DEVELOPS:  1.  A fever that is above 101   2.  If there is a large amount of drainage, bleeding, or swelling.  3.  Severe pain that is not relieved by your prescription.  4.   Drainage that is thick, cloudy, yellow, green or white.  5.  Any other questions not answered by  Frequently Asked Questions  sheet.       FOLLOW UP WITH UROLOGY IN 1 WEEK----[phone]

## 2019-04-23 NOTE — PLAN OF CARE
Patient VSS, tmax 100.1, A/O, family spent the night.  Patient with pain at 3/10, controlled with one dose of IV dilaudid.  Denies nausea, NPO except ice, up with SBA to bathroom.   Surgery to see this am to decide on surgical plan.

## 2019-04-23 NOTE — ANESTHESIA CARE TRANSFER NOTE
Patient: Carmen Dumont    Procedure(s):  LAPAROSCOPIC CHOLECYSTECTOMY WITH CHOLANGIOGRAMS    Diagnosis: cholelithasis  Diagnosis Additional Information: No value filed.    Anesthesia Type:   General, ETT     Note:  Airway :Face Mask  Patient transferred to:PACU  Comments: VSS.Handoff Report: Identifed the Patient, Identified the Reponsible Provider, Reviewed the pertinent medical history, Discussed the surgical course, Reviewed Intra-OP anesthesia mangement and issues during anesthesia, Set expectations for post-procedure period and Allowed opportunity for questions and acknowledgement of understanding      Vitals: (Last set prior to Anesthesia Care Transfer)    CRNA VITALS  4/23/2019 1318 - 4/23/2019 1355      4/23/2019             Pulse:  89    SpO2:  98 %    Resp Rate (observed):  6  (Abnormal)                 Electronically Signed By: JAYDON Solorzano CRNA  April 23, 2019  1:55 PM

## 2019-04-23 NOTE — PROGRESS NOTES
Respiratory Therapy  An EKG was completed and placed in the patient's chart.    Larry Flores, RT  4/22/2019 at 8:26 PM

## 2019-04-23 NOTE — H&P
History and Physical     Carmen Dumont MRN# [id number]   YOB: 1940 Age: 79 year old      Date of Admission:  4/22/2019    Primary care provider: Addis, Josue Sheffield          Assessment and Plan:   Carmen Dumont is a 79 year old female with a PMH significant for arthritis, asthma and glaucoma who presents with acute onset of right upper quadrant abdominal pain with nausea and dry heaving.    Patient was a direct admit from [facility] urgent care by Dr. Dixon.    1. Abdominal pain, likely acute cholecystitis  Presents with acute onset of right upper quadrant/epigastric abdominal discomfort associated with persistent nausea and dry heaving.  She is afebrile with WBC of 7.5.  Abdominal CT with contrast showed a 2.3 cm stone in the gallbladder neck with diffuse gallbladder wall thickening and inflammatory change.  LFTs and lipase are normal.  The pain is controlled now after receiving IV narcotics.  Echocardiogram done in April 2018 showed normal left ventricular size and systolic function with normal wall motion.  Her EF at that time was 55% with some evidence of mild LVH.  -Right upper quadrant ultrasound ordered  -N.p.o. except for meds and ice chips  -Surgery consult  -IV pain and nausea medications available  -Repeat CMP and CBC in a.m.  -Preop EKG ordered    2.  History of asthma  Denies cough and shortness of breath.  Lungs sound clear        Social: No concerns  Code: Discussed with patient and they have chosen Full code  VTE prophylaxis: PCDs  Disposition: Observation                    Chief Complaint:   Right upper quadrant pain         History of Present Illness:   Carmen Dumont is a 79 year old female who presents with acute onset of constant right upper quadrant/epigastric discomfort associated with persistent nausea and dry heaving.  Symptoms began at 2 AM and have been constant since with no fever or chills.  She has no history of similar symptoms.  She has  not been ill recently and denies diarrhea, urinary symptoms, cough and shortness of breath.  She is taking all of her medications as prescribed and states she only uses Tylenol intermittently.  She does not smoke cigarettes and drinks alcohol daily but has no history of withdrawal.             Past Medical History:   Arthritis            Past Surgical History:     Past Surgical History:   Procedure Laterality Date      SECTION       COLONOSCOPY  2019    Dr. Priest [facility]     COLONOSCOPY N/A 2019    Procedure: COLONOSCOPY with biopsies by cold forcep and polypectomy by cold snare;  Surgeon: Carmen Loaiza MD;  Location:  GI     utreine fibroid                 Social History:     Social History     Socioeconomic History     Marital status:      Spouse name: Not on file     Number of children: Not on file     Years of education: Not on file     Highest education level: Not on file   Occupational History     Not on file   Social Needs     Financial resource strain: Not on file     Food insecurity:     Worry: Not on file     Inability: Not on file     Transportation needs:     Medical: Not on file     Non-medical: Not on file   Tobacco Use     Smoking status: Former Smoker     Smokeless tobacco: Never Used   Substance and Sexual Activity     Alcohol use: Yes     Comment: wine as needed     Drug use: Not on file     Sexual activity: Not on file   Lifestyle     Physical activity:     Days per week: Not on file     Minutes per session: Not on file     Stress: Not on file   Relationships     Social connections:     Talks on phone: Not on file     Gets together: Not on file     Attends Protestant service: Not on file     Active member of club or organization: Not on file     Attends meetings of clubs or organizations: Not on file     Relationship status: Not on file     Intimate partner violence:     Fear of current or ex partner: Not on file     Emotionally abused: Not on file     Physically  abused: Not on file     Forced sexual activity: Not on file   Other Topics Concern     Parent/sibling w/ CABG, MI or angioplasty before 65F 55M? Not Asked   Social History Narrative     Not on file               Family History:     Family History   Problem Relation Age of Onset     Colon Cancer No family hx of               Allergies:      Allergies   Allergen Reactions     Penicillin G Itching     Sulfa Drugs Itching               Medications:     Prior to Admission medications    Medication Sig Last Dose Taking? Auth Provider   calcium carbonate (OS-RACHEL) 500 MG tablet Take 1 tablet by mouth 2 times daily   Reported, Patient   multivitamin w/minerals (MULTI-VITAMIN) tablet Take 1 tablet by mouth daily   Reported, Patient   vitamin C (ASCORBIC ACID) 100 MG tablet Take 100 mg by mouth 3 times daily   Reported, Patient   vitamin D3 (CHOLECALCIFEROL) 2000 units tablet Take 1 tablet by mouth daily   Reported, Patient              Review of Systems:   A Comprehensive greater than 10 system review of systems was carried out.  Pertinent positives and negatives are noted above.  Otherwise negative for contributory information.            Physical Exam:   Blood pressure 156/89, pulse 91, temperature 100.1  F (37.8  C), temperature source Temporal, resp. rate 20, SpO2 96 %.  Exam:  GENERAL:  Comfortable.  PSYCH: pleasant, oriented, No acute distress.  HEENT:  PERRLA. Normal conjunctiva, normal hearing, nasal mucosa and Oropharynx are normal.  NECK:  Supple, no neck vein distention, adenopathy or bruits, normal thyroid.  HEART:  Normal S1, S2 with no murmur, no pericardial rub, gallops or S3 or S4.  LUNGS:  Clear to auscultation, normal Respiratory effort. No wheezing, rales or ronchi.  ABDOMEN:  Soft, no hepatosplenomegaly, normal bowel sounds. Tender RUQ with some epigastric discomfort, non distended.   EXTREMITIES:  No pedal edema, +2 pulses bilateral and equal.  SKIN:  Dry to touch, No rash, wound or  ulcerations.  NEUROLOGIC:  CN 2-12 grossly intact,  sensation is intact with no focal deficits.               Data:     Recent Labs   Lab 04/22/19  1826      POTASSIUM 4.0   CHLORIDE 103   CO2 29   ANIONGAP 4   *   BUN 7   CR 0.72   GFRESTIMATED 78   GFRESTBLACK >90   RACHEL 10.1   PROTTOTAL 7.8   ALBUMIN 3.8   BILITOTAL 0.7   ALKPHOS 96   AST 18   ALT 17     Recent Labs   Lab 04/22/19  1826   LIPASE 162         No results found for this or any previous visit (from the past 24 hour(s)).      Belia Lancaster PA-C    This patient was seen and discussed with Dr. Slaughter who agrees with the current plans as outlined above.

## 2019-04-23 NOTE — ADDENDUM NOTE
Addendum  created 04/23/19 1407 by Carl Saldana, DO    Review and Sign - Ready for Procedure, Review and Sign - Signed

## 2019-04-23 NOTE — PLAN OF CARE
PRIMARY DIAGNOSIS: CHOLECYSTITIS  OUTPATIENT/OBSERVATION GOALS TO BE MET BEFORE DISCHARGE:  Diagnostic test and consults (if applicable) complete: Yes    Vitals signs stable or return to baseline: hypertensive. Low fever.    Tolerate oral intake to maintain hydration: NPO     Pain status: Pain free.    Tolerating oral antibiotics or has plans for home infusion setup:  n/a     Return to near baseline physical activity: Yes    Discharge Planner Nurse   Safe discharge environment identified: Yes  Barriers to discharge: Yes       Entered by: Lucinda Ghosh 04/23/2019 12:32 PM     Please review provider order for any additional goals.   Nurse to notify provider when observation goals have been met and patient is ready for discharge.    Pt is A&O. Hypertensive, low fever. Denies pain, nausea and SOB. NPO for surgery. Up SBA. Surgical scrub/shower complete. Lap wesley book given to pt and son. Questions answered. Transferred to surg at 11am.

## 2019-04-23 NOTE — OP NOTE
"General Surgery Operative Note    PREOPERATIVE DIAGNOSIS:  cholelithasis cholecystitis    POSTOPERATIVE DIAGNOSIS:  Same, advanced chronic and acute cholecystitis with gangrenous gallbladder    PROCEDURE:  Laparoscopic Cholecystectomy with fluoroscopic cholangiogram    ANESTHESIA:  General.    PREOPERATIVE MEDICATIONS:  Ancef IV.    SURGEON:  Juan Hines MD    ASSISTANT:  Bre Russell PA-C The physicians assistant was medically necessary for their expertise in camera management, suctioning, suturing, and retraction.    ESTIMATED BLOOD LOSS:  25cc's    INDICATIONS:  Carmen Dumont is a 79 year old female who has been experiencing episodes of RUQ and RLQ abdominal pain. Abdominal imaging has revealed gallstones and gallbladder wall thickening consistent with acute cholecystitis.  She now presents for laparoscopic cholecystectomy after having risks and benefits reviewed in detail.      PROCEDURE:  The patient was brought to the operating room, placed supine on the table and after induction of anesthetic, the abdomen was prepped and draped in sterile manner.  Pause was performed.  An incision was made above the umbilicus and extended down to the midline fascia which was then opened.  A Beatriz trocar was inserted.  Gas was insufflated.  The laparoscope was advanced.  There was no evidence or underlying bowel or tissue injury.  Secondary trocars were placed under laparoscopic guidance.  Only a very small portion of the gallbladder was immediately visible, the remainder of the gallbladder was completely covered with both acute and chronic adhesions of the omentum.  We gradually began taking down these adhesions.  We continued down to the duodenum which was also adherent to the gallbladder but no evidence for fistula was identified.  Once the adhesions to the \"free\" wall of the gallbladder completely removed, the gallbladder was aspirated of its contents to allow placement of a grasper on the gallbladder wall " which was tensely distended.  Fluid was sent to the lab for culture.  Portions of the gallbladder wall appeared to be necrotic but had not ruptured.  The gallbladder was grasped and retracted cephalad.  The infundibulum was grasped and retracted laterally.  The region of the cystic duct was stripped of its peritoneal and fatty coverings and followed to its confluence with the common hepatic and common bile ducts.  Once this confluence was identified, the region behind the infundibulum was also dissected, finding no aberrant ducts but identifying the cystic artery.  A fluoroscopic cholangiogram was obtained with the Winters catheter and clamp.  This showed normal anatomy, no filling defects to suggest stones and good drainage into the duodenum.  Radiology reviewed the films and concurred.  The cholangiogram catheter was removed.  The gallbladder was then clipped at the infundibulum and 2 clips were placed on the cystic duct a generous distance from its confluence with the common hepatic and common bile ducts.  The cystic duct was then cut from the gallbladder.  Similarly, the cystic artery was triply clipped and divided.  The gallbladder was then removed from its hepatic fossa with electrocautery.  The normal plane between the gallbladder and the liver was completely obliterated.  The lower portion of the gallbladder was dissected from a heavy layer of scar tissue.  This produced persistent oozing that was controlled as dissection progressed.  The upper portion of the gallbladder was densely scarred to the liver itself.  Once the gallbladder was removed from the liver, the liver was carefully cauterized to assure complete hemostasis in the upper portion of the gallbladder bed.  Once the gallbladder was removed from its fossa, it was placed in an Endocatch pouch and removed from the abdomen through the supraumbilical incision at the end of the procedure.  This required bringing the open portion of the Endo Catch pouch  out through the incision, grasping a portion of the gallbladder and pulling that through the fascial incision and then incising the wall of the gallbladder, then reaching inside the gallbladder to grasp the very large stone and partially crushing it to allow passage of the gallbladder through the gallbladder wall.  There was no spillage of stone material within the abdomen.  Due to the extensive scarring and persistent oozing throughout the procedure, although it appeared to have essentially stopped at the conclusion of the procedure, I did elect to place drain in the subhepatic space to evacuate any fluid that may accumulate there.  15 Robert drain was therefore placed through the lateralmost trocar site..  Marcaine was placed at each of the trocar sites and they were sequentially removed and viewed from within to be sure no bleeding was present and none was seen.  The final trocar was then removed after venting as much gas as possible from the abdomen and the fascia was specifically closed with 0 Vicryl sutures.  Subcuticular skin closure was performed followed by placement of Steri-Strips and dressings and she was returned to the recovery room in excellent condition with all sponge and needle counts correct, having tolerated the procedure well.    INTRAOPERATIVE FINDINGS: Advanced chronic, acute and subacute cholecystitis with patchy necrosis.  Negative intraoperative cholangiogram.  Normal liver surface and anterior wall of the stomach.  The duodenum was inspected at the conclusion of the procedure and was completely intact and appeared normal although there was some edema in the periduodenal tissues.  Terminal ileum and appendix were visualized and appeared normal peritoneum appeared normal as well.  Pelvic organs are not visible from the trocar placement.    Patient's very advanced acute and chronic cholecystitis with extensive scarring and fibrosis dramatically increased difficulty of the procedure.  This  added at least 50% to the operative time.    Specimens:   ID Type Source Tests Collected by Time Destination   1 : bile Fluid Gallbladder and Contents ANAEROBIC BACTERIAL CULTURE, FLUID CULTURE AEROBIC BACTERIAL, GRAM STAIN Juan Hines MD 4/23/2019 12:36 PM    A : gallbladder Tissue Gallbladder and Contents SURGICAL PATHOLOGY EXAM Juan Hines MD 4/23/2019  1:35 PM        Juan Hines MD

## 2019-04-23 NOTE — ANESTHESIA POSTPROCEDURE EVALUATION
Patient: Carmen Dumont    Procedure(s):  LAPAROSCOPIC CHOLECYSTECTOMY WITH CHOLANGIOGRAMS    Diagnosis:cholelithasis  Diagnosis Additional Information: cholelithasis cholecystitis      advanced chronic and acute cholecystitis with gangrenous gallbladder    Anesthesia Type:  General, ETT    Note:  Anesthesia Post Evaluation    Patient location during evaluation: PACU  Patient participation: Able to fully participate in evaluation  Level of consciousness: awake  Pain management: adequate  Airway patency: patent  Cardiovascular status: acceptable  Respiratory status: acceptable  Hydration status: acceptable  PONV: controlled     Anesthetic complications: None          Last vitals:  Vitals:    04/23/19 1059 04/23/19 1352 04/23/19 1355   BP: 133/85 148/76    Pulse:  78    Resp:  15 19   Temp: 99.4  F (37.4  C) 96.8  F (36  C)    SpO2: 98%  100%         Electronically Signed By: Carl Saldana DO  April 23, 2019  2:07 PM

## 2019-04-23 NOTE — PROGRESS NOTES
[facility]    Hospitalist Progress Note  Provider : Annemarie Rosales MD  Date of Service (when I saw the patient): 04/23/2019    Assessment & Plan   Carmen Dumont is a 79 year old female with a PMH significant for arthritis, asthma and glaucoma who presents with acute onset of right upper quadrant abdominal pain with nausea and dry heaving.  Patient underwent laparoscopic cholecystectomy with fluoroscopic cholangiogram today.     Patient was a direct admit from [facility] urgent care by Dr. Dixon.     1. Abdominal pain, likely acute cholecystitis  --Status post lap cholecystectomy with fluoroscopic cholangiogram, POD #0  -IV pain and nausea medications available  -Repeat CMP and CBC in a.m.     2.  History of asthma  --Denies cough and shortness of breath.  Lungs sound clear     Social: No concerns  Code: Discussed with patient and they have chosen Full code  VTE prophylaxis: PCDs  Disposition: Observation     Code Status: Full Code    Disposition: Expected discharge in 1-2 days    Annemarie Rosales MD    Interval History   Patient seen and examined she states that she has right upper abdominal pain.  She denies nausea or vomiting.  No fever    -Data reviewed today: I reviewed all new labs and imaging results over the last 24 hours. I personally reviewed   Physical Exam   Temp: 96.8  F (36  C) Temp src: Temporal BP: 133/69 Pulse: 66 Heart Rate: 76 Resp: 18 SpO2: 100 % O2 Device: Nasal cannula Oxygen Delivery: 2 LPM  There were no vitals filed for this visit.  Vital Signs with Ranges  Temp:  [96.8  F (36  C)-100.1  F (37.8  C)] 96.8  F (36  C)  Pulse:  [] 66  Heart Rate:  [70-94] 76  Resp:  [7-24] 18  BP: (133-171)/(69-92) 133/69  SpO2:  [95 %-100 %] 100 %  I/O last 3 completed shifts:  In: 1026 [I.V.:1026]  Out: 150 [Urine:150]    GEN:  Alert, oriented x 3, appears comfortable, NAD.  HEENT:  Normocephalic/atraumatic, no scleral icterus, no nasal discharge, mouth  moist.  CV:  Regular rate and rhythm, no murmur or JVD.  S1 + S2 noted, no S3 or S4.  LUNGS:  Clear to auscultation bilaterally without rales/rhonchi/wheezing/retractions.  Symmetric chest rise on inhalation noted.  ABD:  Active bowel sounds, soft, right upper quadrant tenderness.  No rebound/guarding/rigidity.  EXT:  No edema or cyanosis.  Hands/feet warm to touch with good signs of peripheral perfusion.  No joint synovitis noted.  SKIN:  Dry to touch, no exanthems noted in the visualized areas.  NEURO:  Symmetric muscle strength, sensation to touch grossly intact.  No new focal deficits appreciated.    Medications     lactated ringers       lactated ringers       lactated ringers 100 mL/hr at 04/23/19 0422     - MEDICATION INSTRUCTIONS -       - MEDICATION INSTRUCTIONS -           Data   Recent Labs   Lab 04/23/19  0612 04/22/19  1826   WBC 14.2*  --    HGB 12.9  --    MCV 87  --      --     136   POTASSIUM 3.9 4.0   CHLORIDE 105 103   CO2 28 29   BUN 7 7   CR 0.72 0.72   ANIONGAP 5 4   RACHEL 9.7 10.1   * 133*   ALBUMIN 3.4 3.8   PROTTOTAL 7.3 7.8   BILITOTAL 0.7 0.7   ALKPHOS 85 96   ALT 16 17   AST 15 18   LIPASE  --  162       Recent Results (from the past 24 hour(s))   US Abdomen Limited    Narrative    US ABDOMEN LIMITED   4/22/2019 7:57 PM     HISTORY: Abdominal pain.    COMPARISON: None.    FINDINGS: The liver is unremarkable. No evidence for fatty  infiltration. No focal hepatic lesions. The gallbladder wall is  diffusely thickened, measuring up to 0.6 cm. There is a shadowing  gallstone in the gallbladder neck. No pericholecystic fluid. Negative  sonographic Srivastava's sign. No intra or extrahepatic bile duct  dilatation. The common duct could not be visualized in its entirety.  Limited evaluation of the right kidney is unremarkable. Pancreas is  partially obscured by overlying bowel gas, but appears normal where  seen. The abdominal aorta and IVC are of normal caliber where  visualized.  The exam was somewhat limited related to patient body  habitus and prominent overlying bowel gas.      Impression    IMPRESSION:   1. Cholelithiasis and gallbladder wall thickening. Findings are  suspicious for acute cholecystitis.  2. The exam was somewhat limited related to patient body habitus and  prominent overlying bowel gas.        SHILO PALACIOS MD

## 2019-04-23 NOTE — PLAN OF CARE
PRIMARY DIAGNOSIS: Cholecysitis  OUTPATIENT/OBSERVATION GOALS TO BE MET BEFORE DISCHARGE:  Diagnostic test and consults (if applicable) complete: Yes    Vitals signs stable or return to baseline: YES     Tolerate oral intake to maintain hydration: Tolerating Ice chips. IV fluids     Pain status: Pain with movement    Tolerating oral antibiotics or has plans for home infusion setup:  Yes.     Return to near baseline physical activity: No. Just returned to floor from surgery    Discharge Planner Nurse   Safe discharge environment identified: Yes  Barriers to discharge: Yes, pain management, activity and IV abx started.       Entered by: FAINA LOVE 04/23/2019 6:23 PM     Please review provider order for any additional goals.   Nurse to notify provider when observation goals have been met and patient is ready for discharge.

## 2019-04-23 NOTE — ANESTHESIA PREPROCEDURE EVALUATION
Anesthesia Pre-Procedure Evaluation    Patient: Carmen Dumont   MRN: [id number] : 1940          Preoperative Diagnosis: cholelithasis    Procedure(s):  LAPAROSCOPIC CHOLECYSTECTOMY WITH CHOLANGIOGRAMS    History reviewed. No pertinent past medical history.  Past Surgical History:   Procedure Laterality Date      SECTION       COLONOSCOPY  2019    Dr. Priest [facility]     COLONOSCOPY N/A 2019    Procedure: COLONOSCOPY with biopsies by cold forcep and polypectomy by cold snare;  Surgeon: Carmen Loaiza MD;  Location:  GI     utreine fibroid       Anesthesia Evaluation     .             ROS/MED HX    ENT/Pulmonary:  - neg pulmonary ROS   (+)asthma , . .    Neurologic:  - neg neurologic ROS     Cardiovascular:  - neg cardiovascular ROS       METS/Exercise Tolerance:     Hematologic:  - neg hematologic  ROS       Musculoskeletal:  - neg musculoskeletal ROS (+) arthritis,  -       GI/Hepatic:  - neg GI/hepatic ROS       Renal/Genitourinary:  - ROS Renal section negative       Endo:  - neg endo ROS       Psychiatric:  - neg psychiatric ROS       Infectious Disease:  - neg infectious disease ROS       Malignancy:         Other: Comment: .Lab Test        19                       0612          WBC          14.2*         HGB          12.9          MCV          87            PLT          248            Lab Test        19                       0612          1826          NA           138          136           POTASSIUM    3.9          4.0           CHLORIDE     105          103           CO2          28           29            BUN          7            7             CR           0.72         0.72          ANIONGAP     5            4             RACHEL          9.7          10.1          GLC          129*         133*                                   Physical Exam  Normal systems: cardiovascular and pulmonary    Airway   Mallampati: II    Dental     Cardiovascular    Rhythm and rate: regular and normal      Pulmonary    breath sounds clear to auscultation            Lab Results   Component Value Date    WBC 14.2 (H) 04/23/2019    HGB 12.9 04/23/2019    HCT 40.5 04/23/2019     04/23/2019     04/23/2019    POTASSIUM 3.9 04/23/2019    CHLORIDE 105 04/23/2019    CO2 28 04/23/2019    BUN 7 04/23/2019    CR 0.72 04/23/2019     (H) 04/23/2019    RACHEL 9.7 04/23/2019    ALBUMIN 3.4 04/23/2019    PROTTOTAL 7.3 04/23/2019    ALT 16 04/23/2019    AST 15 04/23/2019    ALKPHOS 85 04/23/2019    BILITOTAL 0.7 04/23/2019    LIPASE 162 04/22/2019       Preop Vitals  BP Readings from Last 3 Encounters:   04/23/19 133/85   02/27/19 117/74    Pulse Readings from Last 3 Encounters:   04/23/19 78   02/27/19 69      Resp Readings from Last 3 Encounters:   04/23/19 14   02/27/19 20    SpO2 Readings from Last 3 Encounters:   04/23/19 98%   02/27/19 98%      Temp Readings from Last 1 Encounters:   04/23/19 99.4  F (37.4  C)    Ht Readings from Last 1 Encounters:   No data found for Ht      Wt Readings from Last 1 Encounters:   No data found for Wt    There is no height or weight on file to calculate BMI.       Anesthesia Plan      History & Physical Review  History and physical reviewed and following examination; no interval change.    ASA Status:  2 .        Plan for General and ETT with Intravenous induction. Maintenance will be Inhalation and Balanced.           Postoperative Care      Consents  Anesthetic plan, risks, benefits and alternatives discussed with:  Patient or representative..                 Carl Saldana DO                    .

## 2019-04-24 LAB
ANION GAP SERPL CALCULATED.3IONS-SCNC: 6 MMOL/L (ref 3–14)
BASOPHILS # BLD AUTO: 0 10E9/L (ref 0–0.2)
BASOPHILS NFR BLD AUTO: 0.1 %
BUN SERPL-MCNC: 8 MG/DL (ref 7–30)
CALCIUM SERPL-MCNC: 9.2 MG/DL (ref 8.5–10.1)
CHLORIDE SERPL-SCNC: 108 MMOL/L (ref 94–109)
CO2 SERPL-SCNC: 24 MMOL/L (ref 20–32)
COPATH REPORT: NORMAL
CREAT SERPL-MCNC: 0.72 MG/DL (ref 0.52–1.04)
DIFFERENTIAL METHOD BLD: ABNORMAL
EOSINOPHIL # BLD AUTO: 0 10E9/L (ref 0–0.7)
EOSINOPHIL NFR BLD AUTO: 0.3 %
ERYTHROCYTE [DISTWIDTH] IN BLOOD BY AUTOMATED COUNT: 14.3 % (ref 10–15)
GFR SERPL CREATININE-BSD FRML MDRD: 79 ML/MIN/{1.73_M2}
GLUCOSE SERPL-MCNC: 85 MG/DL (ref 70–99)
HCT VFR BLD AUTO: 40.5 % (ref 35–47)
HGB BLD-MCNC: 12.4 G/DL (ref 11.7–15.7)
IMM GRANULOCYTES # BLD: 0.1 10E9/L (ref 0–0.4)
IMM GRANULOCYTES NFR BLD: 0.5 %
LYMPHOCYTES # BLD AUTO: 1.1 10E9/L (ref 0.8–5.3)
LYMPHOCYTES NFR BLD AUTO: 8 %
MCH RBC QN AUTO: 28 PG (ref 26.5–33)
MCHC RBC AUTO-ENTMCNC: 30.6 G/DL (ref 31.5–36.5)
MCV RBC AUTO: 91 FL (ref 78–100)
MONOCYTES # BLD AUTO: 1 10E9/L (ref 0–1.3)
MONOCYTES NFR BLD AUTO: 7 %
NEUTROPHILS # BLD AUTO: 11.7 10E9/L (ref 1.6–8.3)
NEUTROPHILS NFR BLD AUTO: 84.1 %
NRBC # BLD AUTO: 0 10*3/UL
NRBC BLD AUTO-RTO: 0 /100
PLATELET # BLD AUTO: 207 10E9/L (ref 150–450)
POTASSIUM SERPL-SCNC: 4 MMOL/L (ref 3.4–5.3)
RBC # BLD AUTO: 4.43 10E12/L (ref 3.8–5.2)
SODIUM SERPL-SCNC: 138 MMOL/L (ref 133–144)
WBC # BLD AUTO: 13.9 10E9/L (ref 4–11)

## 2019-04-24 PROCEDURE — 85025 COMPLETE CBC W/AUTO DIFF WBC: CPT | Performed by: INTERNAL MEDICINE

## 2019-04-24 PROCEDURE — 36415 COLL VENOUS BLD VENIPUNCTURE: CPT | Performed by: INTERNAL MEDICINE

## 2019-04-24 PROCEDURE — 99217 ZZC OBSERVATION CARE DISCHARGE: CPT | Performed by: INTERNAL MEDICINE

## 2019-04-24 PROCEDURE — 99207 ZZC CDG-CODE CATEGORY CHANGED: CPT | Performed by: INTERNAL MEDICINE

## 2019-04-24 PROCEDURE — 25000128 H RX IP 250 OP 636: Performed by: SURGERY

## 2019-04-24 PROCEDURE — 80048 BASIC METABOLIC PNL TOTAL CA: CPT | Performed by: INTERNAL MEDICINE

## 2019-04-24 RX ORDER — OXYCODONE HYDROCHLORIDE 5 MG/1
5-10 TABLET ORAL
Qty: 10 TABLET | Refills: 0 | Status: SHIPPED | OUTPATIENT
Start: 2019-04-24 | End: 2019-04-25

## 2019-04-24 RX ADMIN — METRONIDAZOLE 500 MG: 500 INJECTION, SOLUTION INTRAVENOUS at 03:22

## 2019-04-24 RX ADMIN — METRONIDAZOLE 500 MG: 500 INJECTION, SOLUTION INTRAVENOUS at 10:52

## 2019-04-24 RX ADMIN — CIPROFLOXACIN 400 MG: 2 INJECTION, SOLUTION INTRAVENOUS at 07:09

## 2019-04-24 RX ADMIN — METRONIDAZOLE 500 MG: 500 INJECTION, SOLUTION INTRAVENOUS at 19:04

## 2019-04-24 RX ADMIN — Medication 0.2 MG: at 00:00

## 2019-04-24 RX ADMIN — Medication 0.2 MG: at 06:15

## 2019-04-24 NOTE — PLAN OF CARE
PT A&Ox4. VSS afebrile. RA  IV infusing. IV Cipro and Flagul abx started. 4 lap sites c/d/I. EBL 25. Capnography WNL. Tolerated ice chips and water. NO nausea. UP with SBA of 1 gait belt and walker. Ambulated to BR X2 voided small amounts. Bladder scan 800ml. St Cath at 2310 for 900 ML. BS active. JESSICA had 30 ml out. Pt declined pain medications until very end of shift. Next RN will give her IV Dilaudid. Intermittent tingling baseline Bilateral feet. Possible discharge tomorrow.

## 2019-04-24 NOTE — DISCHARGE SUMMARY
[facility]    Discharge Summary  Hospitalist    Date of Admission:  4/22/2019  Date of Discharge:  4/24/2019  Discharging Provider: Annemarie Rosales MD  Date of Service (when I saw the patient): 04/24/19    Discharge Diagnoses      1.  Acute cholecystitis status post laparoscopic cholecystectomy with fluoroscopic cholangiogram     2.  History of asthma, stable    History of Present Illness   Carmen Dumont is an 79 year old female who presented with abdominal pain.Patient was a direct admit from [facility] urgent care by Dr. Dixon.  Ultrasound of the abdomen showed cholelithiasis and gallbladder more thickening concerning for acute cholecystitis.  She underwent laparoscopic cholecystectomy.  She tolerated the procedure.    Hospital Course     Carmen Dumont is a 79 year old female with a PMH significant for arthritis, asthma and glaucoma who presented with acute onset of right upper quadrant abdominal pain with nausea and dry heaving.   Ultrasound of the abdomen showed cholelithiasis and gallbladder wall thickening concerning for acute cholecystitis.  She was started on IV fluid and pain medications.  General surgery was consulted and she underwent  laparoscopic cholecystectomy with fluoroscopic cholangiogram on 4/23.  She tolerated the procedure well.  She was monitored in the hospital on 20 her postoperative course.  She had urinary retention for which she required straight cath.  Surgery recommended to discharge her with Carrillo catheter with plan to follow-up with urology as outpatient.  This was discussed with patient and she agreed to Carrillo catheter placement.  She was advised to follow-up with urology in 1 week.    1. Abdominal pain, likely acute cholecystitis  --Status post lap cholecystectomy with fluoroscopic cholangiogram, POD #1  -Pain well controlled.  No fever.  Vital signs are stable.  Patient was seen by general surgery and recommended discharge.     2.  History  of asthma  --Denies cough and shortness of breath.  Lungs sound clear    3.  Postop urine retention: Patient having up to 800 cc postvoid residual.  She required straight cath.  Surgery recommended Carrillo catheter on discharge with a plan to follow-up with urology in 1 week.  Discussed with patient and she agreed to Carrillo catheter placement on discharge please follow-up with urology in 1 week for voiding trial and Carrillo catheter removal.  Orders placed.    Patient remained stable during hospital course.  She was discharged home in a stable condition.       Significant Results and Procedures   Results for orders placed or performed during the hospital encounter of 04/22/19   US Abdomen Limited    Narrative    US ABDOMEN LIMITED   4/22/2019 7:57 PM     HISTORY: Abdominal pain.    COMPARISON: None.    FINDINGS: The liver is unremarkable. No evidence for fatty  infiltration. No focal hepatic lesions. The gallbladder wall is  diffusely thickened, measuring up to 0.6 cm. There is a shadowing  gallstone in the gallbladder neck. No pericholecystic fluid. Negative  sonographic Srivastava's sign. No intra or extrahepatic bile duct  dilatation. The common duct could not be visualized in its entirety.  Limited evaluation of the right kidney is unremarkable. Pancreas is  partially obscured by overlying bowel gas, but appears normal where  seen. The abdominal aorta and IVC are of normal caliber where  visualized. The exam was somewhat limited related to patient body  habitus and prominent overlying bowel gas.      Impression    IMPRESSION:   1. Cholelithiasis and gallbladder wall thickening. Findings are  suspicious for acute cholecystitis.  2. The exam was somewhat limited related to patient body habitus and  prominent overlying bowel gas.        SHILO PALACIOS MD         Pending Results   None  Code Status   Full Code       Primary Care Physician   Josue Mena [facility]    0    Discharge Disposition   Discharged to  home  Condition at discharge: Stable    Consultations This Hospital Stay   SURGERY GENERAL IP CONSULT    Time Spent on this Encounter   IAnnemarie MD, personally saw the patient today and spent greater than 30 minutes discharging this patient.    Discharge Orders      Reason for your hospital stay    Acute cholecystitis-s/p lap cholecystectomy     Follow-up and recommended labs and tests     Follow up with primary care provider, Josue Mena [facility], within 7 days     Activity    Your activity upon discharge: activity as tolerated     Full Code     Diet    Follow this diet upon discharge: Orders Placed This Encounter      Low Fat Diet     Discharge Medications   Current Discharge Medication List      START taking these medications    Details   oxyCODONE (ROXICODONE) 5 MG tablet Take 1-2 tablets (5-10 mg) by mouth every 3 hours as needed for moderate to severe pain  Qty: 10 tablet, Refills: 0    Associated Diagnoses: S/P laparoscopic cholecystectomy         CONTINUE these medications which have NOT CHANGED    Details   acetaminophen (TYLENOL) 500 MG tablet Take 500-1,000 mg by mouth 2 times daily as needed for mild pain      Ascorbic Acid (VITAMIN C PO) Take 1 tablet by mouth daily      brimonidine (ALPHAGAN-P) 0.15 % ophthalmic solution Place 1 drop into both eyes At Bedtime      calcium-magnesium (CALMAG) 500-250 MG TABS per tablet Take 1 tablet by mouth daily as needed      Hypromellose (ARTIFICIAL TEARS OP) Place 1 drop into both eyes At Bedtime To help with burning when using Brimonidine      multivitamin w/minerals (MULTI-VITAMIN) tablet Take 1 tablet by mouth daily      Omega-3 Fatty Acids (FISH OIL PO) Take 2 capsules by mouth 2 times daily      POTASSIUM PO Take 1 tablet by mouth daily Over the counter product      vitamin D3 (CHOLECALCIFEROL) 2000 units tablet Take 1 tablet by mouth daily             Allergies   Allergies   Allergen Reactions     Penicillin G Itching     Sulfa Drugs  Itching     Data   Most Recent 3 CBC's:  Recent Labs   Lab Test 04/24/19  0628 04/23/19  0612   WBC 13.9* 14.2*   HGB 12.4 12.9   MCV 91 87    248      Most Recent 3 BMP's:  Recent Labs   Lab Test 04/24/19  0628 04/23/19  0612 04/22/19  1826    138 136   POTASSIUM 4.0 3.9 4.0   CHLORIDE 108 105 103   CO2 24 28 29   BUN 8 7 7   CR 0.72 0.72 0.72   ANIONGAP 6 5 4   RACHEL 9.2 9.7 10.1   GLC 85 129* 133*     Most Recent 2 LFT's:  Recent Labs   Lab Test 04/23/19  0612 04/22/19  1826   AST 15 18   ALT 16 17   ALKPHOS 85 96   BILITOTAL 0.7 0.7     Most Recent INR's and Anticoagulation Dosing History:  Anticoagulation Dose History     There is no flowsheet data to display.        Most Recent 3 Troponin's:No lab results found.  Most Recent Cholesterol Panel:No lab results found.  Most Recent 6 Bacteria Isolates From Any Culture (See EPIC Reports for Culture Details):  Recent Labs   Lab Test 04/23/19  1236   CULT Culture negative monitoring continues  Culture negative monitoring continues     Most Recent TSH, T4 and A1c Labs:No lab results found.

## 2019-04-24 NOTE — PROGRESS NOTES
"[facility]   General Surgery Progress Note           Assessment and Plan:   Assessment:   -Cholecystitis and Cholelithiasis  -Leukocytosis on admission due to above; improving  -POD#1 s/p Laparoscopic cholecystectomy, negative cholangiogram, aspiration of bile -gram stain negative for organisms/WBCs; noted intra-op: Advanced chronic, acute and subacute cholecystitis with patchy necrosis =path and cultures pending  -postoperative hypoxia, supplemental oxygen in place  -postoperative urinary retention with straight cath 4/23 at 2100 for 900cc  -Afebrile      Plan:   -Monitor for continued urinary retention vs improvement.  Recommended to straight cath if PVR >300, if continued issues throughout the day would then place ruiz catheter and discharge home with catheter in place with plans for f/u with urologist (due to str. cath amount of 900cc)  -Pain management: pt prefers to try acetaminophen only, oxycodone po available if needed  -Diet: advance to low fat diet as tolerated  -ambulate and use IS hourly  -Dispo: OK to discharge home later today if voiding vs with catheter in place, from surgical standpoint.  No antibiotic needed at home.  Daily dressing change of previous drain site.  RTC for postop appt in 2-3 weeks.  Low fat diet for 1-2 weeks.  Bowel program discussed if needed.  Rx done for oxycodone #10, pt will decide prior to discharge if this will be needed at home (fill rx or not).         Interval History:   Comfortable in bed currently, had \"excrutiating\" pain at the right abd earlier today with activity (site of drain).  Otherwise abd sore at incisions as expected.  No flatus yet.  Ambulating to bathroom.  Voiding issues with straight cath last night for 900cc, pt declined straight cath earlier this morning when PVR 350cc.  Discussed process with patient and recommendation to straight cath if PVR >300 to assist with bladder recovery.  Ambulation recommended.           Physical Exam:   Blood " "pressure 113/72, pulse 78, temperature 97.6  F (36.4  C), temperature source Oral, resp. rate 10, height 1.66 m (5' 5.35\"), weight 92.3 kg (203 lb 6.4 oz), SpO2 98 %.    I/O last 3 completed shifts:  In: 1989 [P.O.:670; I.V.:1319]  Out: 1260 [Urine:1150; Drains:85; Blood:25]    Abdomen:   soft, non-distended, tenderness noted at incisions and near drain  Incs - clean, dry, steri-strips intact      Robert - scant serous fluid in bulb, no blood/clots, no bile.  Drain removed without issues.  Dressing placed and care discussed.           Data:     Recent Labs   Lab 04/23/19  1236   CULT Culture negative monitoring continues  PENDING     Recent Labs   Lab 04/24/19 0628 04/23/19  0612   WBC 13.9* 14.2*   HGB 12.4 12.9   HCT 40.5 40.5   MCV 91 87    248     Recent Labs   Lab 04/24/19  0628 04/23/19  0612 04/22/19  1826    138 136   POTASSIUM 4.0 3.9 4.0   CHLORIDE 108 105 103   CO2 24 28 29   ANIONGAP 6 5 4   GLC 85 129* 133*   BUN 8 7 7   CR 0.72 0.72 0.72   GFRESTIMATED 79 79 78   GFRESTBLACK >90 >90 >90   RACHEL 9.2 9.7 10.1   PROTTOTAL  --  7.3 7.8   ALBUMIN  --  3.4 3.8   BILITOTAL  --  0.7 0.7   ALKPHOS  --  85 96   AST  --  15 18   ALT  --  16 17       Bre Russell PA-C       "

## 2019-04-24 NOTE — PLAN OF CARE
Slept well.  Pain controlled with IV Dilaudid.  Denies nausea.   Bowel  sounds active. Denies passing flatus.  Vital signs stable. Maintains oxygen saturation levels 100% on 3L/NC.  4 abdominal dressings- 2 are dry/intact, 2 with outlined drainage unchanged from midnight.   JESSICA drain output minimal, bloody.  No urge to void. Bladder scanned for 350cc. Will be getting up to attempt to void.  0620: Voided small amount in bathroom. PVR= 382cc. Pt refused catheterization stating that she wants to work on drinking more and try again later.

## 2019-04-24 NOTE — PLAN OF CARE
Pt up SBA. Denies pain. Has hard script for oxy at desk if wanting it when discharged. On IV abx. Dressings CDI. SL. Encourage PO. Burping but no flatus. VSS, RA. Straight cath'd around 1330 for 1150 ml. Plan: pt needs to show she is able to empty bladder, If unable to this evening pt will need order for ruiz and will go home with ruiz with follow up in 1 week with urology.

## 2019-04-25 VITALS
OXYGEN SATURATION: 97 % | WEIGHT: 203.4 LBS | BODY MASS INDEX: 33.89 KG/M2 | HEIGHT: 65 IN | HEART RATE: 78 BPM | TEMPERATURE: 98 F | RESPIRATION RATE: 16 BRPM | DIASTOLIC BLOOD PRESSURE: 57 MMHG | SYSTOLIC BLOOD PRESSURE: 114 MMHG

## 2019-04-25 LAB
GLUCOSE BLDC GLUCOMTR-MCNC: 98 MG/DL (ref 70–99)
INTERPRETATION ECG - MUSE: NORMAL

## 2019-04-25 PROCEDURE — 82962 GLUCOSE BLOOD TEST: CPT

## 2019-04-25 NOTE — PLAN OF CARE
A&Ox4, forgetful at times but easily reminded. VSS. SBA with gb and walker. Voiding ok but most recent . IV access lost, ok to leave out and abx d/c'd. Pt declined wanting to discharge tonight, stated she would feel better with one more night here. Denies pain, has t pump which is effective. Passing gas. Plans to discharge home tomorrow.

## 2019-04-25 NOTE — PROGRESS NOTES
"[facility]   General Surgery Progress Note           Assessment and Plan:   Assessment:   -Cholecystitis and Cholelithiasis  -Leukocytosis on admission due to above; improving  -POD#2 s/p Laparoscopic cholecystectomy, negative cholangiogram, aspiration of bile -gram stain negative for organisms/WBCs; noted intra-op: Advanced chronic, acute and subacute cholecystitis with patchy necrosis =cultures pending  -Pathology: acute cholecystitis, stones  -Postoperative hypoxia, transient need for supplemental oxygen; resolved  -Continued postoperative urinary retention with multiple straight cath's; 4/23 at 2100 for 900cc, 4/25 at 0400 for 800cc  -Afebrile      Plan:   -Consider discharge home with catheter in place with plans for f/u with urologist (due to str. cath amount of 900&800cc), will defer this to hospitalist involved.  -Pain management: acetaminophen prn, declines oxycodone or need for discharge rx  -Diet: low fat diet, 1-2 weeks  -Dispo: OK to discharge home today from surgical standpoint.  No antibiotic needed at home.  Daily dressing change of previous drain site.  RTC for postop appt in 2-3 weeks.  Bowel program discussed if needed.  Rx: none needed.  Discharge instructions in chart.         Interval History:   Comfortable up in chair.  Eating and tolerating this well.  +flatus, no BM (discussed outpt bowel regimen if needed).  Mild soreness at incisions with movement but not needing pain meds.           Physical Exam:   Blood pressure 117/57, pulse 62, temperature 97.9  F (36.6  C), temperature source Oral, resp. rate 16, height 1.66 m (5' 5.35\"), weight 92.3 kg (203 lb 6.4 oz), SpO2 97 %.    I/O last 3 completed shifts:  In: 1100 [P.O.:1100]  Out: 3900 [Urine:3900]    Abdomen:   soft, non-distended, tenderness noted at periumbilical incision, normal bowel sounds  Incs - clean, dry, steri-strips intact      Previous drain site - scant drainage on dressing, changed.           Data:   Pathology: " Gallbladder and contents, resection:.   - Cholelithiasis.   - Acute cholecystitis.   - No evidence of malignancy.      Recent Labs   Lab 04/23/19  1236   CULT Culture negative monitoring continues  Culture negative monitoring continues     Recent Labs   Lab 04/24/19 0628 04/23/19  0612   WBC 13.9* 14.2*   HGB 12.4 12.9   HCT 40.5 40.5   MCV 91 87    248     Recent Labs   Lab 04/24/19 0628 04/23/19 0612 04/22/19  1826    138 136   POTASSIUM 4.0 3.9 4.0   CHLORIDE 108 105 103   CO2 24 28 29   ANIONGAP 6 5 4   GLC 85 129* 133*   BUN 8 7 7   CR 0.72 0.72 0.72   GFRESTIMATED 79 79 78   GFRESTBLACK >90 >90 >90   RACHEL 9.2 9.7 10.1   PROTTOTAL  --  7.3 7.8   ALBUMIN  --  3.4 3.8   BILITOTAL  --  0.7 0.7   ALKPHOS  --  85 96   AST  --  15 18   ALT  --  16 17       Bre Russell PA-C     As above, path benign, cultures negative so far, nl LFTs (yesterday)  Voiding still an issue  Juan Hines MD  4/25/2019 11:38 AM

## 2019-04-25 NOTE — PROGRESS NOTES
Patient seen and examined today.  She is doing well.  She denies any nausea, vomiting, abdominal pain.  She is still unable to urinate.  Her post void residual is about 800 cc.  She was straight cath.  Surgery recommending Carrillo catheter placement on discharge.  This was discussed with her and she agreed to Carrillo catheter placement with a plan to follow-up with urology in 1 week.  Discharge summary completed.  Patient stable for discharge.

## 2019-04-25 NOTE — PLAN OF CARE
Noc RN- Pt resting comfortably most of the shift. Pt continues to have high post void residual at 800cc, ot was straight cathed for 800cc at 0400, pt upset with cath procedure. Vital signs stable, pt denies pain.

## 2019-04-25 NOTE — PLAN OF CARE
Pt A&O x4. VS stable; afebrile. Denies pain. CMS intact. Dressing's changed-CDI. Up w/ SBA, using gait belt, and walker. Voiding but retaining-ruiz catheter placed. Tolerating regular diet-minimal appetite.     Ruiz cathter discharge instructions printed and given to the patient. Pt was able to correctly demonstrate how to clean, empty, and change ruiz. Leg bag also sent home with patient.     Reviewed discharge instructions and medications with patient and son. Questions answered. Patient discharged to home via son with, discharge instructions, and belongings at this time.

## 2019-04-28 LAB
BACTERIA SPEC CULT: NO GROWTH
SPECIMEN SOURCE: NORMAL

## 2019-04-30 ENCOUNTER — TELEPHONE (OUTPATIENT)
Dept: SURGERY | Facility: CLINIC | Age: 79
End: 2019-04-30

## 2019-04-30 ENCOUNTER — TELEPHONE (OUTPATIENT)
Dept: UROLOGY | Facility: CLINIC | Age: 79
End: 2019-04-30

## 2019-04-30 LAB
BACTERIA SPEC CULT: NORMAL
Lab: NORMAL
SPECIMEN SOURCE: NORMAL

## 2019-04-30 NOTE — TELEPHONE ENCOUNTER
Patient called nurse line and LM. Returned phone call and spoke with patient. She reports some light-red blood in urine. Patient has not seen any blood clots and urine appears to be draining okay. Patient was recommended to drink plenty of water and that some bleeding is normal. Patient will follow-up in clinic later this week as planned.     Ashley Lancaster LPN

## 2019-04-30 NOTE — TELEPHONE ENCOUNTER
S/p Moreno Chaudhary, 4/23/19  Surgeon:  Dr. Hines    Pt had urine retention after surgery requiring straight cath and was sent home with ruiz catheter and is scheduled to follow up with Urology on 5/3/19 (per pt) for removal of catheter and voiding trial.      She is calling today to report that she has blood in her urine in ruiz bag.  Urine is light red in color. She reports that she has had some twinges of pain at site, but denies fever/ chills, foul odor or urine sediment/cloudiness.       Advised patient that she should call urology today to discuss likely need for UA/UC.  If she is unable to be seen in urology she should call her PCP as she should be evaluated today for blood in urine.    Pt verbalizes understanding and agrees with plan.

## 2019-05-03 ENCOUNTER — OFFICE VISIT (OUTPATIENT)
Dept: UROLOGY | Facility: CLINIC | Age: 79
End: 2019-05-03
Payer: COMMERCIAL

## 2019-05-03 VITALS
HEIGHT: 65 IN | DIASTOLIC BLOOD PRESSURE: 72 MMHG | WEIGHT: 193 LBS | BODY MASS INDEX: 32.15 KG/M2 | HEART RATE: 97 BPM | SYSTOLIC BLOOD PRESSURE: 118 MMHG | OXYGEN SATURATION: 97 % | RESPIRATION RATE: 18 BRPM

## 2019-05-03 DIAGNOSIS — R33.9 URINARY RETENTION: Primary | ICD-10-CM

## 2019-05-03 PROCEDURE — 99213 OFFICE O/P EST LOW 20 MIN: CPT | Performed by: PHYSICIAN ASSISTANT

## 2019-05-03 RX ORDER — PYRIDOXINE HCL (VITAMIN B6) 25 MG
25 TABLET ORAL
COMMUNITY
Start: 2017-03-24

## 2019-05-03 RX ORDER — ALBUTEROL SULFATE 90 UG/1
1-2 AEROSOL, METERED RESPIRATORY (INHALATION)
COMMUNITY
Start: 2019-01-21

## 2019-05-03 ASSESSMENT — MIFFLIN-ST. JEOR: SCORE: 1341.32

## 2019-05-03 ASSESSMENT — PAIN SCALES - GENERAL: PAINLEVEL: NO PAIN (0)

## 2019-05-03 NOTE — PROGRESS NOTES
CC: Urinary retention.    HPI: It is a pleasure to see Ms. Carmen Dumont, a pleasant 79 year old female seen today in the urology clinic in hospital follow up of urinary retention. This developed acutely requiring Carrillo catheter placement on 19 following lap cholecystectomy. This has been draining well with pale, yellow urine. The patient is tolerating the catheter without issue. No clots of hematuria noted.    The patient has no prior history of AUR or similar symptoms. At baseline, patient does not have difficulty. Currently denies fevers, chills, N/V, abdominal/back pain.  Bowels regular.     No past medical history on file.  Past Surgical History:   Procedure Laterality Date      SECTION       COLONOSCOPY  2019    Dr. Priest [facility]     COLONOSCOPY N/A 2019    Procedure: COLONOSCOPY with biopsies by cold forcep and polypectomy by cold snare;  Surgeon: Carmen Loaiza MD;  Location: [facility]     LAPAROSCOPIC CHOLECYSTECTOMY WITH CHOLANGIOGRAMS N/A 2019    Procedure: CHOLECYSTECTOMY, LAPAROSCOPIC, WITH CHOLANGIOGRAM;  Surgeon: Juan Hines MD;  Location: [facility] OR     utreine fibroid       Current Outpatient Medications   Medication Sig Dispense Refill     acetaminophen (TYLENOL) 500 MG tablet Take 500-1,000 mg by mouth 2 times daily as needed for mild pain       Ascorbic Acid (VITAMIN C PO) Take 1 tablet by mouth daily       brimonidine (ALPHAGAN-P) 0.15 % ophthalmic solution Place 1 drop into both eyes At Bedtime       calcium-magnesium (CALMAG) 500-250 MG TABS per tablet Take 1 tablet by mouth daily as needed       Hypromellose (ARTIFICIAL TEARS OP) Place 1 drop into both eyes At Bedtime To help with burning when using Brimonidine       multivitamin w/minerals (MULTI-VITAMIN) tablet Take 1 tablet by mouth daily       Omega-3 Fatty Acids (FISH OIL PO) Take 2 capsules by mouth 2 times daily       POTASSIUM PO Take 1 tablet by mouth daily Over the counter product       vitamin D3  "(CHOLECALCIFEROL) 2000 units tablet Take 1 tablet by mouth daily       Allergies   Allergen Reactions     Penicillin G Itching     Sulfa Drugs Itching     Family History: There is no h/o  malignancy.  There is no h/o urolithiasis.     Social History     Socioeconomic History     Marital status:      Spouse name: Not on file     Number of children: Not on file     Years of education: Not on file     Highest education level: Not on file   Occupational History     Not on file   Social Needs     Financial resource strain: Not on file     Food insecurity:     Worry: Not on file     Inability: Not on file     Transportation needs:     Medical: Not on file     Non-medical: Not on file   Tobacco Use     Smoking status: Former Smoker     Smokeless tobacco: Never Used   Substance and Sexual Activity     Alcohol use: Yes     Comment: wine as needed     Drug use: Not on file     Sexual activity: Not on file   Lifestyle     Physical activity:     Days per week: Not on file     Minutes per session: Not on file     Stress: Not on file   Relationships     Social connections:     Talks on phone: Not on file     Gets together: Not on file     Attends Christianity service: Not on file     Active member of club or organization: Not on file     Attends meetings of clubs or organizations: Not on file     Relationship status: Not on file     Intimate partner violence:     Fear of current or ex partner: Not on file     Emotionally abused: Not on file     Physically abused: Not on file     Forced sexual activity: Not on file   Other Topics Concern     Parent/sibling w/ CABG, MI or angioplasty before 65F 55M? Not Asked   Social History Narrative     Not on file       ROS: A comprehensive 14 point ROS was obtained and was  otherwise negative except for that outlined above in the HPI.    PHYSICAL EXAM:   /72   Pulse 97   Resp 18   Ht 1.651 m (5' 5\")   Wt 87.5 kg (193 lb)   SpO2 97%   BMI 32.12 kg/m      GEN: NAD  EYES: " EOMI  MOUTH: MMM  NECK: Supple  RESP: Unlabored breathing  CARDIAC: No LE edema  SKIN: Warm  ABD: soft  NEURO: AAO  : Clear      REVIEW OF OUTSIDE RECORDS: 5 minutes spent reviewing previous/outside records.    ASSESSMENT/PLAN:  79 year old female who developed acute urinary retention post-op requiring Carrillo catheter placement.   -PVR 4 weeks    Should the patient continue to have voiding difficulty, the next appropriate studies would be cystoscopy and/or videourodynamics to better assess bladder function.      Heather Ribeiro PA-C  [facility]    20 min spent with the patient, >50% of this time was spent in a face-to-face manner and on coordination of care of urinary retention.

## 2019-05-03 NOTE — NURSING NOTE
"Chief Complaint   Patient presents with     Follow Up     Pt is here following a cathedar placement for removal.       Blood pressure 118/72, pulse 97, resp. rate 18, height 1.651 m (5' 5\"), weight 87.5 kg (193 lb), SpO2 97 %. Body mass index is 32.12 kg/m .    Patient Active Problem List   Diagnosis     Acute cholecystitis     Cholecystitis       Allergies   Allergen Reactions     Gabapentin Other (See Comments)     Sick on stomach, dizziness  Sick on stomach, dizziness       Penicillin G Itching     Sulfa Drugs Itching       Current Outpatient Medications   Medication Sig Dispense Refill     albuterol (PROAIR HFA) 108 (90 Base) MCG/ACT inhaler Inhale 1-2 puffs into the lungs       Ascorbic Acid (VITAMIN C PO) Take 1 tablet by mouth daily       brimonidine (ALPHAGAN-P) 0.15 % ophthalmic solution Place 1 drop into both eyes At Bedtime       calcium-magnesium (CALMAG) 500-250 MG TABS per tablet Take 1 tablet by mouth daily as needed       Hypromellose (ARTIFICIAL TEARS OP) Place 1 drop into both eyes At Bedtime To help with burning when using Brimonidine       Omega-3 Fatty Acids (FISH OIL PO) Take 2 capsules by mouth 2 times daily       POTASSIUM PO Take 1 tablet by mouth daily Over the counter product       vitamin B6 (PYRIDOXINE) 25 MG tablet Take 25 mg by mouth       vitamin D3 (CHOLECALCIFEROL) 2000 units tablet Take 1 tablet by mouth daily       acetaminophen (TYLENOL) 500 MG tablet Take 500-1,000 mg by mouth 2 times daily as needed for mild pain       multivitamin w/minerals (MULTI-VITAMIN) tablet Take 1 tablet by mouth daily         Social History     Tobacco Use     Smoking status: Former Smoker     Smokeless tobacco: Never Used   Substance Use Topics     Alcohol use: Yes     Comment: wine as needed     Drug use: None       Malick Choi, EMT  5/3/2019         "

## 2019-05-03 NOTE — LETTER
RE: Carmen Dumont  [address]  [place] [address]     Dear Colleague,    Thank you for referring your patient, Carmen Dumont, to the [facility] [place] at [facility]. Please see a copy of my visit note below.    CC: Urinary retention.    HPI: It is a pleasure to see Ms. Carmen Dumont, a pleasant 79 year old female seen today in the urology clinic in hospital follow up of urinary retention. This developed acutely requiring Carrillo catheter placement on 19 following lap cholecystectomy. This has been draining well with pale, yellow urine. The patient is tolerating the catheter without issue. No clots of hematuria noted.    The patient has no prior history of AUR or similar symptoms. At baseline, patient does not have difficulty. Currently denies fevers, chills, N/V, abdominal/back pain.  Bowels regular.     No past medical history on file.  Past Surgical History:   Procedure Laterality Date      SECTION       COLONOSCOPY  2019    Dr. Priest [facility]     COLONOSCOPY N/A 2019    Procedure: COLONOSCOPY with biopsies by cold forcep and polypectomy by cold snare;  Surgeon: Carmen Loaiza MD;  Location:  GI     LAPAROSCOPIC CHOLECYSTECTOMY WITH CHOLANGIOGRAMS N/A 2019    Procedure: CHOLECYSTECTOMY, LAPAROSCOPIC, WITH CHOLANGIOGRAM;  Surgeon: Juan Hines MD;  Location: [facility] OR     utreine fibroid       Current Outpatient Medications   Medication Sig Dispense Refill     acetaminophen (TYLENOL) 500 MG tablet Take 500-1,000 mg by mouth 2 times daily as needed for mild pain       Ascorbic Acid (VITAMIN C PO) Take 1 tablet by mouth daily       brimonidine (ALPHAGAN-P) 0.15 % ophthalmic solution Place 1 drop into both eyes At Bedtime       calcium-magnesium (CALMAG) 500-250 MG TABS per tablet Take 1 tablet by mouth daily as needed       Hypromellose (ARTIFICIAL TEARS OP) Place 1 drop into both eyes  At Bedtime To help with burning when using Brimonidine       multivitamin w/minerals (MULTI-VITAMIN) tablet Take 1 tablet by mouth daily       Omega-3 Fatty Acids (FISH OIL PO) Take 2 capsules by mouth 2 times daily       POTASSIUM PO Take 1 tablet by mouth daily Over the counter product       vitamin D3 (CHOLECALCIFEROL) 2000 units tablet Take 1 tablet by mouth daily       Allergies   Allergen Reactions     Penicillin G Itching     Sulfa Drugs Itching     Family History: There is no h/o  malignancy.  There is no h/o urolithiasis.     Social History     Socioeconomic History     Marital status:      Spouse name: Not on file     Number of children: Not on file     Years of education: Not on file     Highest education level: Not on file   Occupational History     Not on file   Social Needs     Financial resource strain: Not on file     Food insecurity:     Worry: Not on file     Inability: Not on file     Transportation needs:     Medical: Not on file     Non-medical: Not on file   Tobacco Use     Smoking status: Former Smoker     Smokeless tobacco: Never Used   Substance and Sexual Activity     Alcohol use: Yes     Comment: wine as needed     Drug use: Not on file     Sexual activity: Not on file   Lifestyle     Physical activity:     Days per week: Not on file     Minutes per session: Not on file     Stress: Not on file   Relationships     Social connections:     Talks on phone: Not on file     Gets together: Not on file     Attends Latter day service: Not on file     Active member of club or organization: Not on file     Attends meetings of clubs or organizations: Not on file     Relationship status: Not on file     Intimate partner violence:     Fear of current or ex partner: Not on file     Emotionally abused: Not on file     Physically abused: Not on file     Forced sexual activity: Not on file   Other Topics Concern     Parent/sibling w/ CABG, MI or angioplasty before 65F 55M? Not Asked   Social History  "Narrative     Not on file     PHYSICAL EXAM:   /72   Pulse 97   Resp 18   Ht 1.651 m (5' 5\")   Wt 87.5 kg (193 lb)   SpO2 97%   BMI 32.12 kg/m       GEN: NAD  EYES: EOMI  MOUTH: MMM  NECK: Supple  RESP: Unlabored breathing  CARDIAC: No LE edema  SKIN: Warm  ABD: soft  NEURO: AAO  : Clear    REVIEW OF OUTSIDE RECORDS: 5 minutes spent reviewing previous/outside records.    ASSESSMENT/PLAN:  79 year old female who developed acute urinary retention post-op requiring Carrillo catheter placement.   -PVR 4 weeks    Should the patient continue to have voiding difficulty, the next appropriate studies would be cystoscopy and/or videourodynamics to better assess bladder function.      Heather Ribeiro PA-C  [facility]    20 min spent with the patient, >50% of this time was spent in a face-to-face manner and on coordination of care of urinary retention.   "

## 2019-06-03 DIAGNOSIS — R33.9 URINARY RETENTION: Primary | ICD-10-CM

## 2019-08-05 ENCOUNTER — OFFICE VISIT (OUTPATIENT)
Dept: URGENT CARE | Facility: URGENT CARE | Age: 79
End: 2019-08-05
Payer: COMMERCIAL

## 2019-08-05 VITALS
TEMPERATURE: 98.4 F | DIASTOLIC BLOOD PRESSURE: 86 MMHG | SYSTOLIC BLOOD PRESSURE: 126 MMHG | HEART RATE: 98 BPM | WEIGHT: 194.4 LBS | BODY MASS INDEX: 32.35 KG/M2 | OXYGEN SATURATION: 99 %

## 2019-08-05 DIAGNOSIS — S01.312A LACERATION OF LEFT EARLOBE, INITIAL ENCOUNTER: Primary | ICD-10-CM

## 2019-08-05 PROCEDURE — 99203 OFFICE O/P NEW LOW 30 MIN: CPT | Mod: 25 | Performed by: PHYSICIAN ASSISTANT

## 2019-08-05 PROCEDURE — 12011 RPR F/E/E/N/L/M 2.5 CM/<: CPT | Performed by: PHYSICIAN ASSISTANT

## 2019-08-05 NOTE — PATIENT INSTRUCTIONS
Patient Education     Face Laceration: Skin Glue  A laceration is a cut through the skin. A laceration on your face has been closed with skin glue. This is used on cuts that have smooth edges, and are not infected. Skin glue is best used on clean, straight cuts on face in areas that don't get a lot of tension. In some cases, a lower layer of skin may be sutured before skin glue is put on. The skin glue closes the cut within a few minutes. It also provides a water-resistant cover. No bandage is needed. Skin glue peels off on its own within 5 to 10 days.     Home care    Your healthcare provider may prescribe an antibiotic. This is to help prevent infection. Follow all instructions for taking this medicine. Take the medicine every day until it is gone or you are told to stop. You should not have any left over.    The healthcare provider may prescribe medicines for pain. Follow instructions for taking them.    Follow the healthcare provider s instructions on how to care for the cut.    Keep the wound clean and dry. You may shower or bathe as usual, but do not use soaps, lotions, or ointments on the wound area, as these may dissolve the glue. Don't scrub the wound. After bathing, pat the wound dry with a soft towel. Don't soak the cut in water.    Don't scratch, rub, or pick at the film. Don't place tape directly over the film.    Don't apply liquids such as peroxide, ointments, or creams to the wound while the film is in place.    Most facial skin wounds heal without problems. But an infection sometimes occurs despite proper treatment. Watch for the signs of infection listed below.    Keep the wound out of prolonged direct sunlight, especially in the summer months. Sunburn or sun exposure can increase scarring.  Follow-up care  Follow up with your healthcare provider, or as advised. If skin glue was used, the film will fall off by itself in 5 to10 days.   When to seek medical advice  Call your healthcare provider  right away if any of these occur:    Wound bleeds more than a small amount or bleeding doesn't stop    Decreased movement around the injured area    Signs of infection:  ? Increasing pain in the wound  ? Increasing wound redness or swelling  ? Pus or bad odor coming from the wound  ? Fever of 100.4 F (38. C) or as directed by your healthcare provider    Wound edges reopen  Date Last Reviewed: 7/1/2017 2000-2018 [employer]. [address] [place] [address]. All rights reserved. This information is not intended as a substitute for professional medical care. Always follow your healthcare professional's instructions.

## 2019-08-06 NOTE — PROGRESS NOTES
SUBJECTIVE:     Chief Complaint   Patient presents with     Urgent Care     Laceration     start fell today around 4 pm sx  left ear hit chair left ear laceration, tender to the touch, tx cold water and towel      Carmen Dumont is a 79 year old female who presents to the clinic with a laceration on the left top of ear sustained 2 hour(s) ago.  This is a non-work related, self-inflicted and accidental injury.    Mechanism of injury: getting off chair and her foot slipped and grabbed the chair and hit it on the chair.  Did not hit head and no LOC.  Lots of bleeding;.  No other oral or facial trauma noted.  She is not on a blood thinner     Associated symptoms: Denies loss of consciousness, vomiting or confusion.  Denies numbness, weakness, or loss of function  Last tetanus booster within 10 years: yes    Past Medical History:   Diagnosis Date     Congenital renal agenesis and dysgenesis      Hernia, abdominal      Mumps     ~1948     Current Outpatient Medications   Medication     acetaminophen (TYLENOL) 500 MG tablet     albuterol (PROAIR HFA) 108 (90 Base) MCG/ACT inhaler     Ascorbic Acid (VITAMIN C PO)     brimonidine (ALPHAGAN-P) 0.15 % ophthalmic solution     Hypromellose (ARTIFICIAL TEARS OP)     multivitamin w/minerals (MULTI-VITAMIN) tablet     Omega-3 Fatty Acids (FISH OIL PO)     POTASSIUM PO     vitamin B6 (PYRIDOXINE) 25 MG tablet     vitamin D3 (CHOLECALCIFEROL) 2000 units tablet     calcium-magnesium (CALMAG) 500-250 MG TABS per tablet     No current facility-administered medications for this visit.      Social History     Socioeconomic History     Marital status:      Spouse name: Not on file     Number of children: Not on file     Years of education: Not on file     Highest education level: Not on file   Occupational History     Not on file   Social Needs     Financial resource strain: Not on file     Food insecurity:     Worry: Not on file     Inability: Not on file     Transportation  needs:     Medical: Not on file     Non-medical: Not on file   Tobacco Use     Smoking status: Former Smoker     Smokeless tobacco: Never Used   Substance and Sexual Activity     Alcohol use: Yes     Comment: wine as needed     Drug use: Not on file     Sexual activity: Not on file   Lifestyle     Physical activity:     Days per week: Not on file     Minutes per session: Not on file     Stress: Not on file   Relationships     Social connections:     Talks on phone: Not on file     Gets together: Not on file     Attends Pentecostalism service: Not on file     Active member of club or organization: Not on file     Attends meetings of clubs or organizations: Not on file     Relationship status: Not on file     Intimate partner violence:     Fear of current or ex partner: Not on file     Emotionally abused: Not on file     Physically abused: Not on file     Forced sexual activity: Not on file   Other Topics Concern     Parent/sibling w/ CABG, MI or angioplasty before 65F 55M? Not Asked   Social History Narrative     Not on file         EXAM:   The patient appears today in alert,no apparent distress distress  VITALS: /86 (BP Location: Right arm, Patient Position: Chair, Cuff Size: Adult Large)   Pulse 98   Temp 98.4  F (36.9  C) (Oral)   Wt 88.2 kg (194 lb 6.4 oz)   SpO2 99%   BMI 32.35 kg/m      Size of laceration: 1.5  centimeters  Characteristics of the laceration: bleeding- mild, clean, jagged and transverse over the tip of superior ear.  Bruise noted surrounding laceration but no large hematoma noted.  Mastoid non tender.    Tendon function intact: DOES NOT APPLY  Sensation to light touch intact: yes  Pulses intact: yes  Picture included in patient's chart: no    GENERAL APPEARANCE: healthy, alert and no distress  EYES: Eyes grossly normal to inspection, PERRL, conjunctivae and sclerae normal, lids and lashes normal and visual fields normal  HENT: ear canals and TM's normal with no hemotympanum and nose and  mouth without ulcers or lesions  No oral trauma noted   NECK: no adenopathy, no asymmetry, masses, or scars and thyroid normal to palpation  RESP: lungs clear to auscultation - no rales, rhonchi or wheezes  CV: regular rates and rhythm, normal S1 S2, no S3 or S4 and no murmur, click or rub -  MS: extremities normal- no gross deformities noted, no evidence of inflammation in joints, FROM in all extremities.  NEURO  Grossly intact. Gait normal       Assessment:  Laceration of left earlobe, initial encounter    PLAN:  PROCEDURE NOTE::  Wound cleaned with HIBICLENS  Dermabond was applied  After care instructions:  Keep wound clean and dry for the next 24-48 hours  Signs of infection discussed today  May return to work as long as wound is kept clean and dry  Discussed the probability of scarring  Handout given and reviewed.    Red flag signs discussed

## (undated) DEVICE — Device

## (undated) DEVICE — DRAIN JACKSON PRATT 15FR ROUND SIL LF JP-2229

## (undated) DEVICE — KIT ENDO TURNOVER/PROCEDURE W/CLEAN A SCOPE LINERS 103888

## (undated) DEVICE — SYR 50ML LL W/O NDL 309653

## (undated) DEVICE — LINEN POUCH DBL 5427

## (undated) DEVICE — ESU ELEC NDL 1" COATED/INSULATED E1465

## (undated) DEVICE — PREP SCRUB SOL EXIDINE 4% CHG 4OZ 29002-404

## (undated) DEVICE — PREP SKIN SCRUB TRAY 4461A

## (undated) DEVICE — ENDO SNARE EXACTO COLD 9MM LOOP 2.4MMX230CM 00711115

## (undated) DEVICE — LINEN HALF SHEET 5512

## (undated) DEVICE — DRAIN JACKSON PRATT RESERVOIR 100ML SU130-1305

## (undated) DEVICE — GLOVE PROTEXIS W/NEU-THERA 7.5  2D73TE75

## (undated) DEVICE — SUCTION CANISTER MEDIVAC LINER 3000ML W/LID 65651-530

## (undated) DEVICE — ENDO TROCAR FIRST ENTRY KII FIOS Z-THRD 05X100MM CTF03

## (undated) DEVICE — SOL NACL 0.9% INJ 250ML BAG 2B1322Q

## (undated) DEVICE — CONNECTOR STOPCOCK 3 WAY MALE LL HI-FLO MX9311L

## (undated) DEVICE — DRAPE C-ARM 60X42" 1013

## (undated) DEVICE — LINEN TOWEL PACK X10 5473

## (undated) DEVICE — ENDO FORCEP ENDOJAW BIOPSY 2.8MMX230CM FB-220U

## (undated) DEVICE — SYR 30ML LL W/O NDL 302832

## (undated) DEVICE — ENDO TROCAR SLEEVE KII Z-THREADED 05X100MM CTS02

## (undated) DEVICE — BAG CLEAR TRASH 1.3M 39X33" P4040C

## (undated) DEVICE — GOWN XLG DISP 9545

## (undated) DEVICE — DECANTER BAG 2002S

## (undated) DEVICE — SYR 05ML SLIP TIP W/O NDL 309647

## (undated) DEVICE — ESU GROUND PAD ADULT W/CORD E7507

## (undated) DEVICE — SU ETHILON 2-0 PS 18" 585H

## (undated) DEVICE — GLOVE PROTEXIS BLUE W/NEU-THERA 8.0  2D73EB80

## (undated) DEVICE — ENDO TROCAR OPTICAL ACCESS KII Z-THRD 12X100MM C0R85

## (undated) DEVICE — RAD RX ISOVUE 300 (50ML) 61% IOPAMIDOL CHARGE PER ML

## (undated) DEVICE — ENDO TRAP POLYP QUICK CATCH 710201

## (undated) DEVICE — CATH CHOLANGIOGRAM KUMAR CC-019

## (undated) DEVICE — TUBING IV EXTENSION SET ANESTHESIA 34" MLL 2C6227

## (undated) DEVICE — CLIP APPLIER ENDO 5MM M/L LIGAMAX EL5ML

## (undated) DEVICE — LINEN FULL SHEET 5511

## (undated) DEVICE — SU VICRYL 1 CT-2 27" J335H

## (undated) DEVICE — NDL BLUNT 18GA 1.5" W/O FILTER 305180

## (undated) DEVICE — ESU CORD MONOPOLAR 10'  E0510

## (undated) DEVICE — ENDO POUCH UNIV RETRIEVAL SYSTEM INZII 10MM CD001

## (undated) DEVICE — SUCTION IRR STRYKERFLOW II W/TIP 250-070-520

## (undated) DEVICE — DRAPE LEGGINGS 8421

## (undated) DEVICE — SU VICRYL 4-0 PS-2 18" UND J496H

## (undated) DEVICE — SOL NACL 0.9% IRRIG 3000ML BAG 2B7477

## (undated) RX ORDER — ONDANSETRON 2 MG/ML
INJECTION INTRAMUSCULAR; INTRAVENOUS
Status: DISPENSED
Start: 2019-04-23

## (undated) RX ORDER — NEOSTIGMINE METHYLSULFATE 1 MG/ML
VIAL (ML) INJECTION
Status: DISPENSED
Start: 2019-04-23

## (undated) RX ORDER — PROPOFOL 10 MG/ML
INJECTION, EMULSION INTRAVENOUS
Status: DISPENSED
Start: 2019-04-23

## (undated) RX ORDER — BUPIVACAINE HYDROCHLORIDE AND EPINEPHRINE 2.5; 5 MG/ML; UG/ML
INJECTION, SOLUTION EPIDURAL; INFILTRATION; INTRACAUDAL; PERINEURAL
Status: DISPENSED
Start: 2019-04-23

## (undated) RX ORDER — FENTANYL CITRATE 50 UG/ML
INJECTION, SOLUTION INTRAMUSCULAR; INTRAVENOUS
Status: DISPENSED
Start: 2019-02-27

## (undated) RX ORDER — FENTANYL CITRATE 50 UG/ML
INJECTION, SOLUTION INTRAMUSCULAR; INTRAVENOUS
Status: DISPENSED
Start: 2019-04-23

## (undated) RX ORDER — GLYCOPYRROLATE 0.2 MG/ML
INJECTION INTRAMUSCULAR; INTRAVENOUS
Status: DISPENSED
Start: 2019-04-23

## (undated) RX ORDER — LIDOCAINE HYDROCHLORIDE 10 MG/ML
INJECTION, SOLUTION EPIDURAL; INFILTRATION; INTRACAUDAL; PERINEURAL
Status: DISPENSED
Start: 2019-04-23

## (undated) RX ORDER — PHENYLEPHRINE HCL IN 0.9% NACL 1 MG/10 ML
SYRINGE (ML) INTRAVENOUS
Status: DISPENSED
Start: 2019-04-23